# Patient Record
Sex: FEMALE | Race: WHITE | Employment: OTHER | URBAN - METROPOLITAN AREA
[De-identification: names, ages, dates, MRNs, and addresses within clinical notes are randomized per-mention and may not be internally consistent; named-entity substitution may affect disease eponyms.]

---

## 2017-01-16 ENCOUNTER — OFFICE VISIT (OUTPATIENT)
Dept: FAMILY MEDICINE CLINIC | Facility: CLINIC | Age: 37
End: 2017-01-16

## 2017-01-16 VITALS
TEMPERATURE: 99 F | BODY MASS INDEX: 41.89 KG/M2 | HEIGHT: 66 IN | SYSTOLIC BLOOD PRESSURE: 130 MMHG | RESPIRATION RATE: 14 BRPM | HEART RATE: 84 BPM | WEIGHT: 260.63 LBS | DIASTOLIC BLOOD PRESSURE: 88 MMHG

## 2017-01-16 DIAGNOSIS — R30.0 DYSURIA: Primary | ICD-10-CM

## 2017-01-16 LAB
MULTISTIX LOT#: ABNORMAL NUMERIC
NITRITE, URINE: POSITIVE
PH, URINE: 5 (ref 4.5–8)
SPECIFIC GRAVITY: 1 (ref 1–1.03)
URINE-COLOR: YELLOW
UROBILINOGEN,SEMI-QN: 0.2 MG/DL (ref 0–1.9)

## 2017-01-16 PROCEDURE — 87086 URINE CULTURE/COLONY COUNT: CPT | Performed by: FAMILY MEDICINE

## 2017-01-16 PROCEDURE — 87186 SC STD MICRODIL/AGAR DIL: CPT | Performed by: FAMILY MEDICINE

## 2017-01-16 PROCEDURE — 81003 URINALYSIS AUTO W/O SCOPE: CPT | Performed by: FAMILY MEDICINE

## 2017-01-16 PROCEDURE — 99213 OFFICE O/P EST LOW 20 MIN: CPT | Performed by: FAMILY MEDICINE

## 2017-01-16 PROCEDURE — 87088 URINE BACTERIA CULTURE: CPT | Performed by: FAMILY MEDICINE

## 2017-01-16 RX ORDER — CIPROFLOXACIN 500 MG/1
500 TABLET, FILM COATED ORAL 2 TIMES DAILY
Qty: 14 TABLET | Refills: 0 | Status: SHIPPED | OUTPATIENT
Start: 2017-01-16 | End: 2017-01-23

## 2017-01-16 NOTE — PROGRESS NOTES
Truong Mendenhall is a 43year old female. CC:  Patient presents with:  Kidney Problem      HPI:  The patient complains of uti.   Duration: 4 day(s)  Dysuria: yes  Frequency: no  Hesitancy: no  Fever: no  Back pain: yes, L flank area  Hematuria: no  Treatmen cyanosis or edema  RECTAL: not examined  GENITAL: not examined  LYMPH: no supraclavicular nodes  MUSCULOSKELETAL: normal ambulation  NEURO: not examined     ASSESSMENT AND PLAN    1. Dysuria  Take prescribed medications as directed.    Push fluids  - Urine

## 2018-01-18 ENCOUNTER — OFFICE VISIT (OUTPATIENT)
Dept: FAMILY MEDICINE CLINIC | Facility: CLINIC | Age: 38
End: 2018-01-18

## 2018-01-18 VITALS
DIASTOLIC BLOOD PRESSURE: 80 MMHG | WEIGHT: 260 LBS | BODY MASS INDEX: 41.78 KG/M2 | TEMPERATURE: 99 F | HEIGHT: 66 IN | HEART RATE: 96 BPM | SYSTOLIC BLOOD PRESSURE: 160 MMHG | RESPIRATION RATE: 16 BRPM

## 2018-01-18 DIAGNOSIS — I10 ESSENTIAL HYPERTENSION: ICD-10-CM

## 2018-01-18 DIAGNOSIS — B00.1 COLD SORE: Primary | ICD-10-CM

## 2018-01-18 DIAGNOSIS — R73.9 HYPERGLYCEMIA: ICD-10-CM

## 2018-01-18 PROCEDURE — 99213 OFFICE O/P EST LOW 20 MIN: CPT | Performed by: FAMILY MEDICINE

## 2018-01-18 RX ORDER — LISINOPRIL 10 MG/1
10 TABLET ORAL DAILY
Qty: 90 TABLET | Refills: 0 | Status: SHIPPED | OUTPATIENT
Start: 2018-01-18 | End: 2018-04-18

## 2018-01-18 RX ORDER — VALACYCLOVIR HYDROCHLORIDE 1 G/1
2 TABLET, FILM COATED ORAL EVERY 12 HOURS SCHEDULED
Qty: 12 TABLET | Refills: 0 | Status: SHIPPED | OUTPATIENT
Start: 2018-01-18 | End: 2018-01-25

## 2018-01-18 NOTE — PROGRESS NOTES
Gwen Martinez is a 42year old female. CC:  Patient presents with:  Mouth/Lip Problem: sores in mouth, nasal congestion per pt      HPI:  She has her yearly outbreak of cold sores and oral sroes. She is wanting screening for lipid and diabetes.  She h distress  EYE: B conjunctiva and lids normal  HENT: upper lip with cold sore, soft palate with ulcerative lesion, B pinnas, external auditory canals and tympanic membranes are normal.   NECK: No lymphadenopathy, thyromegaly or masses  CAR: S1, S2 normal, R

## 2018-02-17 ENCOUNTER — OFFICE VISIT (OUTPATIENT)
Dept: FAMILY MEDICINE CLINIC | Facility: CLINIC | Age: 38
End: 2018-02-17

## 2018-02-17 VITALS
TEMPERATURE: 98 F | RESPIRATION RATE: 14 BRPM | SYSTOLIC BLOOD PRESSURE: 140 MMHG | HEART RATE: 80 BPM | DIASTOLIC BLOOD PRESSURE: 72 MMHG | WEIGHT: 258 LBS | BODY MASS INDEX: 42 KG/M2

## 2018-02-17 DIAGNOSIS — L03.211 CELLULITIS OF FACE: Primary | ICD-10-CM

## 2018-02-17 PROCEDURE — 99213 OFFICE O/P EST LOW 20 MIN: CPT | Performed by: NURSE PRACTITIONER

## 2018-02-17 RX ORDER — CEPHALEXIN 500 MG/1
500 CAPSULE ORAL 3 TIMES DAILY
Qty: 30 CAPSULE | Refills: 0 | Status: SHIPPED | OUTPATIENT
Start: 2018-02-17 | End: 2018-02-27

## 2018-02-17 NOTE — PROGRESS NOTES
CHIEF COMPLAINT:   Patient presents with:  Head Injury: 4 days ago hit left side of head while fixing a truck. Pt states area now hurts and pain shoots down left ear. Pt states has a bump on left ear.        HPI:     Marianna Esquivel is a 42year old female who LYMPH: no enlargement of the lymph nodes. MUSC/SKEL: no joint swelling, no joint stiffness. NEURO: no abnormal sensation, no tingling of the skin or numbness.     EXAM:   /72   Pulse 80   Temp 98.1 °F (36.7 °C)   Resp 14   Wt 258 lb   LMP 02/09/2018 Cellulitis causes the affected skin to become red, swollen, warm, and sore. The reddened areas have a visible border. You may have a fever, chills, and pain. Cellulitis is treated with antibiotics taken for 7 to 10 days.  Symptoms should get better 1 to 2 The patient indicates understanding of these issues and agrees to the plan. The patient is asked to see PCP in 3 days if symptoms not improving or for worsening symptoms.

## 2018-02-17 NOTE — PATIENT INSTRUCTIONS
If getting worse to follow up in Immediate Care      Facial Cellulitis  Cellulitis is an infection of the deep layers of skin. A break in the skin, such as a cut or scratch, can let bacteria under the skin.  It may also occur from an infected oil gland (pim · An eyelid that swells shut or leaks fluid (pus)  · Headache or neck pain that gets worse  · Unusual drowsiness or confusion  · Convulsions (seizure)  · Change in eyesight     Date Last Reviewed: 9/1/2016  © 7970-9092 [employer] [address].  [address] [facility]

## 2018-03-07 ENCOUNTER — TELEPHONE (OUTPATIENT)
Dept: FAMILY MEDICINE CLINIC | Facility: CLINIC | Age: 38
End: 2018-03-07

## 2018-04-18 RX ORDER — LISINOPRIL 10 MG/1
TABLET ORAL
Qty: 90 TABLET | Refills: 0 | Status: SHIPPED | OUTPATIENT
Start: 2018-04-18 | End: 2018-07-26

## 2018-04-18 NOTE — TELEPHONE ENCOUNTER
Last refilled on 1/18/18 for # 90 with 0 refills    Last seen on 1/18/18, /72  Future Appointments  Date Time Provider Nirmal Levine   5/2/2018 7:15 PM Ryan Calderon MD [facility]        Thank you.

## 2018-05-05 ENCOUNTER — TELEPHONE (OUTPATIENT)
Dept: FAMILY MEDICINE CLINIC | Facility: CLINIC | Age: 38
End: 2018-05-05

## 2018-05-05 RX ORDER — LOSARTAN POTASSIUM 25 MG/1
25 TABLET ORAL DAILY
Qty: 30 TABLET | Refills: 1 | Status: SHIPPED | OUTPATIENT
Start: 2018-05-05 | End: 2018-05-19 | Stop reason: ALTCHOICE

## 2018-05-05 NOTE — TELEPHONE ENCOUNTER
Stop the Lisinopril  Start Losartan 25 mg, 1 qd, #30, 1 rf and see me back in about 4-6 weeks to ensure medication is working fine.   Thanks

## 2018-05-05 NOTE — TELEPHONE ENCOUNTER
LISINOPRIL 10 MG Oral Tab 90 tablet 0 4/18/2018     Sig: TAKE ONE TABLET BY MOUTH ONCE DAILY      Patient states that she has had a dry persistent cough since starting the medication. Believes it may be a side effect.  Should she stop taking this medication

## 2018-05-05 NOTE — TELEPHONE ENCOUNTER
Patient advised. Verbalized understanding.  Medication sent to [address] [facility] in [place] per patient request.

## 2018-05-09 ENCOUNTER — TELEPHONE (OUTPATIENT)
Dept: FAMILY MEDICINE CLINIC | Facility: CLINIC | Age: 38
End: 2018-05-09

## 2018-05-09 NOTE — TELEPHONE ENCOUNTER
Quit taking the meds on Sat, how long should side effects linger? Pt is still experiencing tightness in chest, cough and SOB.

## 2018-05-09 NOTE — TELEPHONE ENCOUNTER
Chest tightness and SOB are not typical Lisinopril side effects. A dry irritating cough is a typical side effect.  If she is not feeling well then I should see her in office this week, erick

## 2018-05-09 NOTE — TELEPHONE ENCOUNTER
Patient advised of the information per Dr. Cordelia Gusman. Appointment made for patient to see Dr. Cordelia Gusman.  Saturday May 19 2018[facility]

## 2018-05-12 ENCOUNTER — OFFICE VISIT (OUTPATIENT)
Dept: FAMILY MEDICINE CLINIC | Facility: CLINIC | Age: 38
End: 2018-05-12

## 2018-05-12 VITALS
DIASTOLIC BLOOD PRESSURE: 78 MMHG | OXYGEN SATURATION: 98 % | WEIGHT: 256 LBS | BODY MASS INDEX: 41 KG/M2 | SYSTOLIC BLOOD PRESSURE: 150 MMHG | HEART RATE: 80 BPM | RESPIRATION RATE: 16 BRPM | TEMPERATURE: 98 F

## 2018-05-12 DIAGNOSIS — J98.01 BRONCHOSPASM: Primary | ICD-10-CM

## 2018-05-12 DIAGNOSIS — J06.9 VIRAL URI: ICD-10-CM

## 2018-05-12 DIAGNOSIS — R05.9 COUGH: ICD-10-CM

## 2018-05-12 PROCEDURE — 99213 OFFICE O/P EST LOW 20 MIN: CPT | Performed by: FAMILY MEDICINE

## 2018-05-12 NOTE — PROGRESS NOTES
HPI:   Palmer Ayala is a 42year old female who presents for upper respiratory symptoms for  6  weeks. Patient reports congestion, clear to yellow colored nasal discharge, cough is keeping pt up at night, wheezing, has cough til she throws up,. She was instr lesions  EYES:PERRLA, EOMI, normal optic disk,conjunctiva are clear  HEENT: atraumatic, normocephalic,ears and throat are clear  NECK: supple,no adenopathy,no bruits  LUNGS: clear to auscultation  CARDIO: RRR without murmur  GI: good BS's,no masses, HSM or

## 2018-05-19 ENCOUNTER — OFFICE VISIT (OUTPATIENT)
Dept: FAMILY MEDICINE CLINIC | Facility: CLINIC | Age: 38
End: 2018-05-19

## 2018-05-19 VITALS
HEART RATE: 83 BPM | OXYGEN SATURATION: 98 % | WEIGHT: 251.81 LBS | TEMPERATURE: 99 F | BODY MASS INDEX: 41 KG/M2 | DIASTOLIC BLOOD PRESSURE: 86 MMHG | RESPIRATION RATE: 16 BRPM | SYSTOLIC BLOOD PRESSURE: 138 MMHG

## 2018-05-19 DIAGNOSIS — I10 ESSENTIAL HYPERTENSION: ICD-10-CM

## 2018-05-19 DIAGNOSIS — R05.9 COUGH: Primary | ICD-10-CM

## 2018-05-19 DIAGNOSIS — R09.81 NASAL CONGESTION: ICD-10-CM

## 2018-05-19 PROCEDURE — 99213 OFFICE O/P EST LOW 20 MIN: CPT | Performed by: FAMILY MEDICINE

## 2018-05-19 RX ORDER — BUDESONIDE AND FORMOTEROL FUMARATE DIHYDRATE 80; 4.5 UG/1; UG/1
2 AEROSOL RESPIRATORY (INHALATION) 2 TIMES DAILY
Qty: 2 INHALER | Refills: 0 | COMMUNITY
Start: 2018-05-19 | End: 2018-09-19 | Stop reason: ALTCHOICE

## 2018-05-19 RX ORDER — MONTELUKAST SODIUM 10 MG/1
10 TABLET ORAL NIGHTLY
Qty: 30 TABLET | Refills: 3 | Status: SHIPPED | OUTPATIENT
End: 2018-09-19 | Stop reason: ALTCHOICE

## 2018-05-19 RX ORDER — FLUTICASONE PROPIONATE 50 MCG
2 SPRAY, SUSPENSION (ML) NASAL DAILY
COMMUNITY
Start: 2018-05-19 | End: 2018-09-19 | Stop reason: ALTCHOICE

## 2018-05-19 NOTE — PROGRESS NOTES
Rocío Layton is a 42year old female. CC:  Patient presents with: Follow - Up: per pt, follow up on cough      HPI:  F/u cough and BP    Cough better with Breo. She has ongoing nasal congestion and does use Flonase daily. She is a former smoker.  She h normal  HENT: Nasal mucosa is blue, boggy with clear discharge.    NECK: No lymphadenopathy, thyromegaly or masses  CAR: S1, S2 normal, RRR; no S3, no S4; no click; murmur negative  PULM: clear to auscultation B, no accessory muscle use  GI: not examined  P

## 2018-07-26 RX ORDER — LISINOPRIL 10 MG/1
TABLET ORAL
Qty: 90 TABLET | Refills: 1 | Status: SHIPPED | OUTPATIENT
Start: 2018-07-26 | End: 2018-12-12 | Stop reason: SINTOL

## 2018-07-26 NOTE — TELEPHONE ENCOUNTER
Last refilled on 4/18/18 for # 90 with 0 refills  Last seen on 5/19/18, /86  No future appointments. Thank you.

## 2018-09-19 ENCOUNTER — OFFICE VISIT (OUTPATIENT)
Dept: FAMILY MEDICINE CLINIC | Facility: CLINIC | Age: 38
End: 2018-09-19

## 2018-09-19 DIAGNOSIS — J01.00 ACUTE MAXILLARY SINUSITIS, RECURRENCE NOT SPECIFIED: Primary | ICD-10-CM

## 2018-09-19 DIAGNOSIS — R05.3 CHRONIC COUGH: ICD-10-CM

## 2018-09-19 PROCEDURE — 99213 OFFICE O/P EST LOW 20 MIN: CPT | Performed by: NURSE PRACTITIONER

## 2018-09-19 RX ORDER — ALBUTEROL SULFATE 90 UG/1
2 AEROSOL, METERED RESPIRATORY (INHALATION) EVERY 4 HOURS PRN
Qty: 1 INHALER | Refills: 0 | Status: SHIPPED | OUTPATIENT
Start: 2018-09-19 | End: 2018-11-24

## 2018-09-19 RX ORDER — AMOXICILLIN AND CLAVULANATE POTASSIUM 875; 125 MG/1; MG/1
1 TABLET, FILM COATED ORAL 2 TIMES DAILY
Qty: 20 TABLET | Refills: 0 | Status: SHIPPED | OUTPATIENT
Start: 2018-09-19 | End: 2018-09-29

## 2018-09-19 RX ORDER — BENZONATATE 200 MG/1
200 CAPSULE ORAL 3 TIMES DAILY PRN
Qty: 15 CAPSULE | Refills: 0 | Status: SHIPPED | OUTPATIENT
Start: 2018-09-19 | End: 2018-11-24

## 2018-09-19 NOTE — PATIENT INSTRUCTIONS
Acute Sinusitis    Acute sinusitis is irritation and swelling of the sinuses. It is usually caused by a viral infection after a common cold. Your doctor can help you find relief. What is acute sinusitis?   Sinuses are air-filled spaces in the skull behin © 1078-7882 [employer] [address]. [address] [facility], [facility] [address]. All rights reserved. This information is not intended as a substitute for professional medical care. Always follow your healthcare professional's instructions.

## 2018-09-19 NOTE — PROGRESS NOTES
CHIEF COMPLAINT:   No chief complaint on file. HPI:   Pradeep Carey is a 34year old female who presents for sinus congestion, drainage, sore throat, ear pressure for  2  weeks.   Reports also having deep hacking cough, has had that for about 7 months, r • Temporomandibular joint disorders, unspecified 12      Past Surgical History:  No date:    History reviewed. No pertinent family history.    Social History    Tobacco Use      Smoking status: Former Smoker        Years: 7.00      Smokeless t PLAN:  Advised will need to follow up with PCP for chronic cough, likely needs chest XR at this point, since other treatments have not worked. Will given albuterol for \"coughing attacks\" at night. . Meds as below. Mucinex to help thin secretions.   Jose Car · Facial soreness pain  · Headache  · Fever  · Fluid draining in the back of the throat (postnasal drip)  · Congestion  · Drainage that is thick and colored, instead of clear  · Cough  Diagnosing acute sinusitis  Your doctor will ask about your symptoms an

## 2018-10-06 ENCOUNTER — TELEPHONE (OUTPATIENT)
Dept: FAMILY MEDICINE CLINIC | Facility: CLINIC | Age: 38
End: 2018-10-06

## 2018-10-06 DIAGNOSIS — R05.9 COUGH: Primary | ICD-10-CM

## 2018-10-06 NOTE — TELEPHONE ENCOUNTER
PT DOES NOT HAVE INSURANCE    PT WENT TO [address] ON 9/19 FOR SINUS INFECTION. SINUS INFECTION IS GONE, COUGH HAS NOT GONE AWAY. COUGH HAS BEEN AROUND SINCE MARCH. WAS ADV ON 9/19 TO GET CHEST X-RAY.  WOULD LIKE TO KNOW IF AN ORDER CAN BE PLACED    ADV

## 2018-10-10 ENCOUNTER — TELEPHONE (OUTPATIENT)
Dept: FAMILY MEDICINE CLINIC | Facility: CLINIC | Age: 38
End: 2018-10-10

## 2018-10-10 NOTE — TELEPHONE ENCOUNTER
Pt is going to do her Chest Xray at the [address] in [place].    She needs to have her order sent over  Fax [phone]

## 2018-10-11 ENCOUNTER — TELEPHONE (OUTPATIENT)
Dept: FAMILY MEDICINE CLINIC | Facility: CLINIC | Age: 38
End: 2018-10-11

## 2018-10-11 ENCOUNTER — MED REC SCAN ONLY (OUTPATIENT)
Dept: FAMILY MEDICINE CLINIC | Facility: CLINIC | Age: 38
End: 2018-10-11

## 2018-10-11 NOTE — TELEPHONE ENCOUNTER
Please let patient know or leave message that her CXR done at [facility] as normal.  If she is still coughing then the next kid is Consult Pulmonology   Jade Melendrez   P: [phone]   Thanks

## 2018-10-11 NOTE — TELEPHONE ENCOUNTER
Patient advised of the x-ray results per Dr. Perlita Gates.  Patient will call back the office if she wants the information for the pulmonologist.

## 2018-11-24 ENCOUNTER — NURSE ONLY (OUTPATIENT)
Dept: FAMILY MEDICINE CLINIC | Facility: CLINIC | Age: 38
End: 2018-11-24

## 2018-11-24 VITALS
SYSTOLIC BLOOD PRESSURE: 120 MMHG | HEIGHT: 67 IN | WEIGHT: 240 LBS | OXYGEN SATURATION: 99 % | BODY MASS INDEX: 37.67 KG/M2 | TEMPERATURE: 98 F | HEART RATE: 90 BPM | RESPIRATION RATE: 16 BRPM | DIASTOLIC BLOOD PRESSURE: 82 MMHG

## 2018-11-24 DIAGNOSIS — R35.0 URINARY FREQUENCY: ICD-10-CM

## 2018-11-24 DIAGNOSIS — R39.9 UTI SYMPTOMS: Primary | ICD-10-CM

## 2018-11-24 PROCEDURE — 99213 OFFICE O/P EST LOW 20 MIN: CPT | Performed by: NURSE PRACTITIONER

## 2018-11-24 PROCEDURE — 81003 URINALYSIS AUTO W/O SCOPE: CPT | Performed by: NURSE PRACTITIONER

## 2018-11-24 RX ORDER — NITROFURANTOIN 25; 75 MG/1; MG/1
100 CAPSULE ORAL 2 TIMES DAILY
Qty: 14 CAPSULE | Refills: 0 | Status: SHIPPED | OUTPATIENT
Start: 2018-11-24 | End: 2018-12-01

## 2018-11-24 RX ORDER — PHENAZOPYRIDINE HYDROCHLORIDE 200 MG/1
200 TABLET, FILM COATED ORAL 3 TIMES DAILY PRN
Qty: 10 TABLET | Refills: 0 | Status: SHIPPED | OUTPATIENT
Start: 2018-11-24 | End: 2020-09-15

## 2018-11-24 NOTE — PROGRESS NOTES
CHIEF COMPLAINT:   Patient presents with:  Urinary Frequency: with urgency, NO dysuria - x5 days      HPI:   Rupert Perez is a 34year old female who presents with symptoms of UTI. Complaining of urinary frequency, urgency, dysuria for 5 days.   Symptoms h Breastfeeding? No   BMI 37.59 kg/m²   GENERAL: well developed, well nourished,in no apparent distress  CARDIO: RRR, no murmurs  LUNGS: clear to ausculation bilaterally, no wheezing or rhonchi  GI: BS present x 4. No hepatosplenomegaly.   : slight suprapu 3 days if not better. Seek care immediately for new onset of fever, vomiting, worsening symptoms.

## 2019-02-21 ENCOUNTER — OFFICE VISIT (OUTPATIENT)
Dept: FAMILY MEDICINE CLINIC | Facility: CLINIC | Age: 39
End: 2019-02-21

## 2019-02-21 VITALS
TEMPERATURE: 98 F | DIASTOLIC BLOOD PRESSURE: 86 MMHG | RESPIRATION RATE: 18 BRPM | SYSTOLIC BLOOD PRESSURE: 128 MMHG | BODY MASS INDEX: 40.02 KG/M2 | WEIGHT: 255 LBS | OXYGEN SATURATION: 100 % | HEIGHT: 67 IN | HEART RATE: 96 BPM

## 2019-02-21 DIAGNOSIS — R39.9 UTI SYMPTOMS: ICD-10-CM

## 2019-02-21 DIAGNOSIS — N30.01 ACUTE CYSTITIS WITH HEMATURIA: Primary | ICD-10-CM

## 2019-02-21 LAB
BILIRUBIN: NEGATIVE
GLUCOSE (URINE DIPSTICK): NEGATIVE MG/DL
KETONES (URINE DIPSTICK): NEGATIVE MG/DL
MULTISTIX LOT#: ABNORMAL NUMERIC
NITRITE, URINE: NEGATIVE
PH, URINE: 6 (ref 4.5–8)
PROTEIN (URINE DIPSTICK): 100 MG/DL
SPECIFIC GRAVITY: 1.02 (ref 1–1.03)
URINE-COLOR: YELLOW
UROBILINOGEN,SEMI-QN: 0.2 MG/DL (ref 0–1.9)

## 2019-02-21 PROCEDURE — 99213 OFFICE O/P EST LOW 20 MIN: CPT | Performed by: PHYSICIAN ASSISTANT

## 2019-02-21 PROCEDURE — 81003 URINALYSIS AUTO W/O SCOPE: CPT | Performed by: PHYSICIAN ASSISTANT

## 2019-02-21 RX ORDER — CIPROFLOXACIN 500 MG/1
500 TABLET, FILM COATED ORAL 2 TIMES DAILY
Qty: 10 TABLET | Refills: 0 | Status: SHIPPED | OUTPATIENT
Start: 2019-02-21 | End: 2019-02-26

## 2019-02-21 NOTE — PROGRESS NOTES
CHIEF COMPLAINT:   Patient presents with:  Urinary Symptoms (urologic): increase in frequency/voiding urgency x 2 weeks. no pain while urinating/fever      HPI:   Dorothy Ramirez is a 34year old female who presents with symptoms of UTI.  Complaining of urina night    Drug use: No        REVIEW OF SYSTEMS:   GENERAL: See above  SKIN: no rashes, no skin wounds or ulcers. GI: See HPI. : See HPI. NEURO: no headaches.     EXAM:   /86 (BP Location: Left arm, Patient Position: Sitting, Cuff Size: large) tablet (500 mg total) by mouth 2 (two) times daily for 5 days. Risk and benefits of medication discussed. Stressed importance of completing full course of antibiotic unless told otherwise.      The patient indicates understanding of these issues and

## 2019-02-27 ENCOUNTER — OFFICE VISIT (OUTPATIENT)
Dept: FAMILY MEDICINE CLINIC | Facility: CLINIC | Age: 39
End: 2019-02-27

## 2019-02-27 ENCOUNTER — TELEPHONE (OUTPATIENT)
Dept: FAMILY MEDICINE CLINIC | Facility: CLINIC | Age: 39
End: 2019-02-27

## 2019-02-27 VITALS
SYSTOLIC BLOOD PRESSURE: 134 MMHG | TEMPERATURE: 98 F | BODY MASS INDEX: 41 KG/M2 | DIASTOLIC BLOOD PRESSURE: 84 MMHG | OXYGEN SATURATION: 98 % | WEIGHT: 262 LBS | HEART RATE: 82 BPM

## 2019-02-27 DIAGNOSIS — R35.0 URINARY FREQUENCY: Primary | ICD-10-CM

## 2019-02-27 DIAGNOSIS — R39.9 UTI SYMPTOMS: ICD-10-CM

## 2019-02-27 PROCEDURE — 99212 OFFICE O/P EST SF 10 MIN: CPT | Performed by: FAMILY MEDICINE

## 2019-02-27 RX ORDER — SULFAMETHOXAZOLE AND TRIMETHOPRIM 800; 160 MG/1; MG/1
TABLET ORAL
Qty: 14 TABLET | Refills: 0 | Status: SHIPPED | OUTPATIENT
Start: 2019-02-27 | End: 2019-03-06

## 2019-02-27 NOTE — TELEPHONE ENCOUNTER
Pt was seen at [facility] for a UTI about a week ago and is still having symptoms. Please call back.

## 2019-02-27 NOTE — TELEPHONE ENCOUNTER
Patient was seen at [facility] on 2 21 2019 and treated for a UTI - Patient declined a urine culture due to cost. She was started on Cipro   Patient was advised to follow up with PCP in 3 days if no better.    Patient states that she finished the antibiotic - but

## 2019-02-27 NOTE — PROGRESS NOTES
George Rsoales is a 34year old female. CC:  Patient presents with:  UTI: per pt- frequency, no burning      HPI:  F/u UTI. Seen at [facility] on 2/21/19. Diagnosed with UTI based on symptoms and U/A.  She is still having UTI symptoms of frequency despite being NECK: No lymphadenopathy, thyromegaly or masses  CAR: S1, S2 normal, RRR; no S3, no S4; no click; murmur negative  PULM: clear to auscultation B, no accessory muscle use  GI: not examined  PSYCH: alert and oriented x 3; affect appropriate  SKIN: not exam

## 2019-10-15 ENCOUNTER — OFFICE VISIT (OUTPATIENT)
Dept: FAMILY MEDICINE CLINIC | Facility: CLINIC | Age: 39
End: 2019-10-15

## 2019-10-15 VITALS
BODY MASS INDEX: 41 KG/M2 | OXYGEN SATURATION: 100 % | DIASTOLIC BLOOD PRESSURE: 64 MMHG | HEART RATE: 88 BPM | SYSTOLIC BLOOD PRESSURE: 162 MMHG | TEMPERATURE: 98 F | RESPIRATION RATE: 16 BRPM | WEIGHT: 262 LBS

## 2019-10-15 DIAGNOSIS — R39.9 UTI SYMPTOMS: Primary | ICD-10-CM

## 2019-10-15 DIAGNOSIS — N30.01 ACUTE CYSTITIS WITH HEMATURIA: ICD-10-CM

## 2019-10-15 PROCEDURE — 99213 OFFICE O/P EST LOW 20 MIN: CPT | Performed by: PHYSICIAN ASSISTANT

## 2019-10-15 PROCEDURE — 81003 URINALYSIS AUTO W/O SCOPE: CPT | Performed by: PHYSICIAN ASSISTANT

## 2019-10-15 RX ORDER — NITROFURANTOIN 25; 75 MG/1; MG/1
100 CAPSULE ORAL 2 TIMES DAILY
Qty: 14 CAPSULE | Refills: 0 | Status: SHIPPED | OUTPATIENT
Start: 2019-10-15 | End: 2019-10-22

## 2019-10-15 NOTE — PROGRESS NOTES
CHIEF COMPLAINT:   Patient presents with:  Urinary Symptoms (urologic): increase in frequency/pain while urinating x last night. no fever      HPI:   Coit Mount Wolf is a 37year old female who presents with symptoms of UTI.  Complaining of urinary frequency, chills, or body aches  SKIN: no rashes, no skin wounds or ulcers. CARDIOVASCULAR: denies chest pain or palpitations  LUNGS: denies shortness of breath, cough, or wheezing  GI: See HPI. No N/V/C/D. : See HPI.   Musculo: No back pain     EXAM:   BP (!) 1 discussed. Stressed importance of completing full course of antibiotic unless told otherwise. The patient indicates understanding of these issues and agrees to the plan. The patient is asked to see PCP in 3 days if not better.  Seek care immediately

## 2019-10-15 NOTE — PATIENT INSTRUCTIONS
Push fluids   Please follow up with PCP if no improvement or if symptoms worsen   Blood pressure elevated in office. Monitor at home.  Follow up with PCP

## 2020-06-15 ENCOUNTER — OFFICE VISIT (OUTPATIENT)
Dept: FAMILY MEDICINE CLINIC | Facility: CLINIC | Age: 40
End: 2020-06-15

## 2020-06-15 VITALS
TEMPERATURE: 99 F | HEART RATE: 106 BPM | BODY MASS INDEX: 42.02 KG/M2 | SYSTOLIC BLOOD PRESSURE: 140 MMHG | HEIGHT: 64.5 IN | RESPIRATION RATE: 14 BRPM | OXYGEN SATURATION: 99 % | WEIGHT: 249.19 LBS | DIASTOLIC BLOOD PRESSURE: 76 MMHG

## 2020-06-15 DIAGNOSIS — Z30.9 ENCOUNTER FOR CONTRACEPTIVE MANAGEMENT, UNSPECIFIED TYPE: Primary | ICD-10-CM

## 2020-06-15 DIAGNOSIS — M77.11 LATERAL EPICONDYLITIS OF RIGHT ELBOW: ICD-10-CM

## 2020-06-15 PROCEDURE — 99213 OFFICE O/P EST LOW 20 MIN: CPT | Performed by: FAMILY MEDICINE

## 2020-06-15 RX ORDER — MEDROXYPROGESTERONE ACETATE 150 MG/ML
150 INJECTION, SUSPENSION INTRAMUSCULAR
Qty: 1 ML | Refills: 3 | Status: SHIPPED | OUTPATIENT
Start: 2020-06-15 | End: 2020-06-16

## 2020-06-15 NOTE — PROGRESS NOTES
Rupert Perez is a 37year old female. CC:  Patient presents with:  Menstrual Problem: per pt- wanting to control period  Elbow Pain: right elbow, hard to extend      HPI:  She would like to reduce frequency of menses. She is done having children.  Her h BMI 42.11 kg/m²    Reviewed by Nelson Ross M.D.     Physical Exam:  GEN: well developed, well nourished, in no apparent distress  EYE: B conjunctiva and lids normal  HENT: B pinnas, external auditory canals and tympanic membranes are normal.   NECK: No lymp

## 2020-06-16 ENCOUNTER — NURSE ONLY (OUTPATIENT)
Dept: FAMILY MEDICINE CLINIC | Facility: CLINIC | Age: 40
End: 2020-06-16

## 2020-06-16 DIAGNOSIS — Z30.9 ENCOUNTER FOR CONTRACEPTIVE MANAGEMENT, UNSPECIFIED TYPE: Primary | ICD-10-CM

## 2020-06-16 PROCEDURE — 81025 URINE PREGNANCY TEST: CPT | Performed by: FAMILY MEDICINE

## 2020-06-16 PROCEDURE — 96372 THER/PROPH/DIAG INJ SC/IM: CPT | Performed by: FAMILY MEDICINE

## 2020-06-16 RX ORDER — MEDROXYPROGESTERONE ACETATE 150 MG/ML
150 INJECTION, SUSPENSION INTRAMUSCULAR ONCE
Status: COMPLETED | OUTPATIENT
Start: 2020-06-16 | End: 2020-06-16

## 2020-06-16 RX ADMIN — MEDROXYPROGESTERONE ACETATE 150 MG: 150 INJECTION, SUSPENSION INTRAMUSCULAR at 15:08:00

## 2020-06-16 NOTE — PROGRESS NOTES
Patient here for 1st depo. Patient afebrile  Urine preg complete-negative  Patient supplied medication  Administered to left deltoid  Tolerated well  Advised to return in 12-13 weeks 9/8-9/15/2020  Left office in stable condition.    Recall placed

## 2020-07-06 ENCOUNTER — TELEPHONE (OUTPATIENT)
Dept: FAMILY MEDICINE CLINIC | Facility: CLINIC | Age: 40
End: 2020-07-06

## 2020-07-06 NOTE — TELEPHONE ENCOUNTER
Patient advised it is not uncommon for spotting to occur after first depo. This is her body adjusting. Denies any heavy bleeding, abdominal pain or cramping. Routing to Dr. Naomi Greenberg for any further information. No need to call if nothing more to add.

## 2020-07-06 NOTE — TELEPHONE ENCOUNTER
Pt had the Depo done on the 16th. She started spotting since the 25th, its not time for her cycle. She wants to know if this normal. And for how long.    Please return call to [phone]

## 2020-08-04 ENCOUNTER — TELEPHONE (OUTPATIENT)
Dept: FAMILY MEDICINE CLINIC | Facility: CLINIC | Age: 40
End: 2020-08-04

## 2020-08-04 NOTE — TELEPHONE ENCOUNTER
Not much we can do for the spotting that happens initially when starting the Depo Provera shot. It typically slows down and stops at the 6-9 month zeeshan of starting the shot.    Thanks

## 2020-08-04 NOTE — TELEPHONE ENCOUNTER
Pt called she spoke with  about her concerns about the depo shot, she wanted to know if there way anything she can do or take to stop the bleeding?

## 2020-09-15 ENCOUNTER — OFFICE VISIT (OUTPATIENT)
Dept: FAMILY MEDICINE CLINIC | Facility: CLINIC | Age: 40
End: 2020-09-15

## 2020-09-15 VITALS
BODY MASS INDEX: 35.31 KG/M2 | HEART RATE: 91 BPM | HEIGHT: 67 IN | TEMPERATURE: 99 F | WEIGHT: 225 LBS | DIASTOLIC BLOOD PRESSURE: 79 MMHG | OXYGEN SATURATION: 98 % | SYSTOLIC BLOOD PRESSURE: 133 MMHG | RESPIRATION RATE: 18 BRPM

## 2020-09-15 DIAGNOSIS — R39.9 UTI SYMPTOMS: Primary | ICD-10-CM

## 2020-09-15 LAB
BILIRUBIN: NEGATIVE
GLUCOSE (URINE DIPSTICK): NEGATIVE MG/DL
KETONES (URINE DIPSTICK): NEGATIVE MG/DL
MULTISTIX LOT#: 1044 NUMERIC
NITRITE, URINE: NEGATIVE
PH, URINE: 6 (ref 4.5–8)
PROTEIN (URINE DIPSTICK): NEGATIVE MG/DL
SPECIFIC GRAVITY: 1.02 (ref 1–1.03)
URINE-COLOR: YELLOW
UROBILINOGEN,SEMI-QN: 0.2 MG/DL (ref 0–1.9)

## 2020-09-15 PROCEDURE — 87088 URINE BACTERIA CULTURE: CPT | Performed by: NURSE PRACTITIONER

## 2020-09-15 PROCEDURE — 87186 SC STD MICRODIL/AGAR DIL: CPT | Performed by: NURSE PRACTITIONER

## 2020-09-15 PROCEDURE — 3008F BODY MASS INDEX DOCD: CPT | Performed by: NURSE PRACTITIONER

## 2020-09-15 PROCEDURE — 99213 OFFICE O/P EST LOW 20 MIN: CPT | Performed by: NURSE PRACTITIONER

## 2020-09-15 PROCEDURE — 81003 URINALYSIS AUTO W/O SCOPE: CPT | Performed by: NURSE PRACTITIONER

## 2020-09-15 PROCEDURE — 3078F DIAST BP <80 MM HG: CPT | Performed by: NURSE PRACTITIONER

## 2020-09-15 PROCEDURE — 87086 URINE CULTURE/COLONY COUNT: CPT | Performed by: NURSE PRACTITIONER

## 2020-09-15 PROCEDURE — 3075F SYST BP GE 130 - 139MM HG: CPT | Performed by: NURSE PRACTITIONER

## 2020-09-15 RX ORDER — NITROFURANTOIN 25; 75 MG/1; MG/1
100 CAPSULE ORAL 2 TIMES DAILY
Qty: 10 CAPSULE | Refills: 0 | Status: SHIPPED | OUTPATIENT
Start: 2020-09-15 | End: 2020-09-20

## 2020-09-15 NOTE — PATIENT INSTRUCTIONS
1. Rest. Drink plenty of fluids. 2. Nitrofurantoin (Macrobid) as prescribed. 3. We will send urine culture to lab and call you with results in 3-4 days.  At that time we will discuss continuing, stopping, or changing the antibiotic based on the urine cult infections are not contagious. You can't get one from someone else, from a toilet seat, or from sharing a bath. The most common cause of bladder infections is bacteria from the bowels. The bacteria get onto the skin around the opening of the urethra.  From provider told you not to. · Clean yourself correctly after going to the bathroom. Wipe from front to back after using the toilet. This helps prevent the spread of bacteria. · Urinate more often. Don't try to hold urine in for a long time.   · Wear loose-f for professional medical care. Always follow your healthcare professional's instructions.

## 2020-09-15 NOTE — PROGRESS NOTES
CHIEF COMPLAINT:   Patient presents with:  UTI      HPI:   Verna Lemus is a 41year old female who presents with symptoms of UTI. Complaining of urinary frequency, urgency, dysuria for last 2 days.   Denies flank pain, fever, hematuria, nausea, or vomitin LUNGS: clear to ausculation bilaterally, no wheezing or rhonchi  GI: BS present x 4. No hepatosplenomegaly. : No suprapubic tenderness.  No bladder distention, or CVAT     Recent Results (from the past 24 hour(s))   URINALYSIS, AUTO, W/O SCOPE    Collec 1. Rest. Drink plenty of fluids. 2. Nitrofurantoin (Macrobid) as prescribed. 3. We will send urine culture to lab and call you with results in 3-4 days.  At that time we will discuss continuing, stopping, or changing the antibiotic based on the urine cult Bladder infections are not contagious. You can't get one from someone else, from a toilet seat, or from sharing a bath. The most common cause of bladder infections is bacteria from the bowels.  The bacteria get onto the skin around the opening of the ureth · Drink plenty of fluids. This helps to prevent dehydration and flush out your bladder. Do this unless you must restrict fluids for other health reasons, or your healthcare provider told you not to. · Clean yourself correctly after going to the bathroom. Cesar last reviewed this educational content on 11/1/2019  © 0235-7840 [employer] [address]. [address] [facility] [place], [facility] [place]. All rights reserved. This information is not intended as a substitute for professional medical care.  Always foll

## 2020-12-22 ENCOUNTER — OFFICE VISIT (OUTPATIENT)
Dept: FAMILY MEDICINE CLINIC | Facility: CLINIC | Age: 40
End: 2020-12-22

## 2020-12-22 VITALS
WEIGHT: 225 LBS | RESPIRATION RATE: 18 BRPM | OXYGEN SATURATION: 100 % | HEIGHT: 67 IN | BODY MASS INDEX: 35.31 KG/M2 | SYSTOLIC BLOOD PRESSURE: 144 MMHG | DIASTOLIC BLOOD PRESSURE: 60 MMHG | TEMPERATURE: 98 F | HEART RATE: 93 BPM

## 2020-12-22 DIAGNOSIS — K12.0 APHTHOUS STOMATITIS: Primary | ICD-10-CM

## 2020-12-22 DIAGNOSIS — R01.1 CARDIAC MURMUR: ICD-10-CM

## 2020-12-22 PROCEDURE — 3078F DIAST BP <80 MM HG: CPT | Performed by: PHYSICIAN ASSISTANT

## 2020-12-22 PROCEDURE — 3008F BODY MASS INDEX DOCD: CPT | Performed by: PHYSICIAN ASSISTANT

## 2020-12-22 PROCEDURE — 3077F SYST BP >= 140 MM HG: CPT | Performed by: PHYSICIAN ASSISTANT

## 2020-12-22 PROCEDURE — 99213 OFFICE O/P EST LOW 20 MIN: CPT | Performed by: PHYSICIAN ASSISTANT

## 2020-12-22 NOTE — PATIENT INSTRUCTIONS
Understanding Canker Sores  Canker sores (aphthous ulcers) are small, painful sores in the mouth. They occur most often on the tongue, gums, or insides of the cheeks. What causes a canker sore? The exact cause of canker sores is not known.  But they are Mouth sores that seem to be canker sores can be signs of a more serious illness. If you have other signs of illness along with mouth sores, talk with a healthcare provider.  Canker sores can be so painful that they cause problems with talking, eating, or dr

## 2020-12-22 NOTE — PROGRESS NOTES
CHIEF COMPLAINT:   Patient presents with:  Sinus Problem: X 2 days sores on roof of mouth and tongue. fatigue, sinus headache,         HPI:   Jerome Fernando is 41year old female presents to clinic with complaint of  sore throat.   Symptoms began with tender noted in the the HPI.       EXAM:   /60   Pulse 93   Temp 98.3 °F (36.8 °C) (Temporal)   Resp 18   Ht 5' 7\" (1.702 m)   Wt 225 lb (102.1 kg)   LMP 11/25/2020   SpO2 100%   BMI 35.24 kg/m²   GENERAL: well developed, well nourished,in no apparent distr are steadily improving over the next few days to a week.

## 2021-02-11 ENCOUNTER — OFFICE VISIT (OUTPATIENT)
Dept: FAMILY MEDICINE CLINIC | Facility: CLINIC | Age: 41
End: 2021-02-11

## 2021-02-11 VITALS
WEIGHT: 250 LBS | HEIGHT: 67 IN | HEART RATE: 87 BPM | DIASTOLIC BLOOD PRESSURE: 80 MMHG | TEMPERATURE: 98 F | OXYGEN SATURATION: 100 % | BODY MASS INDEX: 39.24 KG/M2 | SYSTOLIC BLOOD PRESSURE: 138 MMHG | RESPIRATION RATE: 16 BRPM

## 2021-02-11 DIAGNOSIS — N30.01 ACUTE CYSTITIS WITH HEMATURIA: Primary | ICD-10-CM

## 2021-02-11 LAB
BILIRUBIN: NEGATIVE
GLUCOSE (URINE DIPSTICK): NEGATIVE MG/DL
KETONES (URINE DIPSTICK): NEGATIVE MG/DL
MULTISTIX LOT#: 5077 NUMERIC
NITRITE, URINE: NEGATIVE
PH, URINE: 6.5 (ref 4.5–8)
PROTEIN (URINE DIPSTICK): 100 MG/DL
SPECIFIC GRAVITY: 1.02 (ref 1–1.03)
URINE-COLOR: YELLOW
UROBILINOGEN,SEMI-QN: 0.2 MG/DL (ref 0–1.9)

## 2021-02-11 PROCEDURE — 87088 URINE BACTERIA CULTURE: CPT | Performed by: NURSE PRACTITIONER

## 2021-02-11 PROCEDURE — 87086 URINE CULTURE/COLONY COUNT: CPT | Performed by: NURSE PRACTITIONER

## 2021-02-11 PROCEDURE — 99213 OFFICE O/P EST LOW 20 MIN: CPT | Performed by: NURSE PRACTITIONER

## 2021-02-11 PROCEDURE — 3075F SYST BP GE 130 - 139MM HG: CPT | Performed by: NURSE PRACTITIONER

## 2021-02-11 PROCEDURE — 3008F BODY MASS INDEX DOCD: CPT | Performed by: NURSE PRACTITIONER

## 2021-02-11 PROCEDURE — 87186 SC STD MICRODIL/AGAR DIL: CPT | Performed by: NURSE PRACTITIONER

## 2021-02-11 PROCEDURE — 81003 URINALYSIS AUTO W/O SCOPE: CPT | Performed by: NURSE PRACTITIONER

## 2021-02-11 PROCEDURE — 3079F DIAST BP 80-89 MM HG: CPT | Performed by: NURSE PRACTITIONER

## 2021-02-11 RX ORDER — NITROFURANTOIN 25; 75 MG/1; MG/1
100 CAPSULE ORAL 2 TIMES DAILY
Qty: 10 CAPSULE | Refills: 0 | Status: SHIPPED | OUTPATIENT
Start: 2021-02-11 | End: 2021-02-16

## 2021-02-11 NOTE — PATIENT INSTRUCTIONS
1. Rest. Drink plenty of fluids. 2. Macrobid as prescribed. 3. We will send urine culture to lab and call you with results in 3-4 days. At that time we will discuss continuing, stopping, or changing the antibiotic based on the urine culture results.   4. may need a low-dose antibiotic for several months. Take antibiotics exactly as directed. Don’t stop taking them until all of the medicine is gone. If you stop taking the antibiotic too soon, the infection may not go away.  You may also develop a resistance

## 2021-02-11 NOTE — PROGRESS NOTES
CHIEF COMPLAINT:   Patient presents with:  UTI: uti sx x 2 days      HPI:   Camelia Howe is a 41year old female who presents with symptoms of UTI. Complaining of urinary frequency, urgency, dysuria for last 2 days.   Denies flank pain, fever, hematuria, n GENERAL: well developed, well nourished,in no apparent distress  CARDIO: RRR, no murmurs  LUNGS: clear to ausculation bilaterally, no wheezing or rhonchi  GI: No tenderness or guarding with palpation. No hepatosplenomegaly. : No suprapubic tenderness.  No Discussed HPI and physical exam with pt. Pt has a reassuring physical exam consistent with a lower uti. Treatment options discussed with patient and explained in detail. Will start Macrobid pending urine culture results.  The risks, benefits and potential s · Cystitis. A bladder infection (cystitis) is the most common UTI in women. You may have urgent or frequent need to pee. You may also have pain, burning when you pee, and bloody urine. · Urethritis.  This is an inflamed urethra, which is the tube that tineo · Use condoms during sex. These help prevent UTIs caused by sexually transmitted bacteria. Also don't use spermicides during sex. These can increase the risk for UTIs. Choose other forms of birth control instead.  For women who tend to get UTIs after sex, a

## 2021-02-25 ENCOUNTER — TELEMEDICINE (OUTPATIENT)
Dept: FAMILY MEDICINE CLINIC | Facility: CLINIC | Age: 41
End: 2021-02-25

## 2021-02-25 ENCOUNTER — TELEPHONE (OUTPATIENT)
Dept: FAMILY MEDICINE CLINIC | Facility: CLINIC | Age: 41
End: 2021-02-25

## 2021-02-25 DIAGNOSIS — N39.0 RECURRENT UTI: Primary | ICD-10-CM

## 2021-02-25 PROCEDURE — 99213 OFFICE O/P EST LOW 20 MIN: CPT | Performed by: FAMILY MEDICINE

## 2021-02-25 RX ORDER — SULFAMETHOXAZOLE AND TRIMETHOPRIM 800; 160 MG/1; MG/1
TABLET ORAL
Qty: 30 TABLET | Refills: 0 | Status: SHIPPED | OUTPATIENT
Start: 2021-02-25 | End: 2021-07-13

## 2021-02-25 NOTE — TELEPHONE ENCOUNTER
Wanted to know about getting a scanning prescription (antibiotic) for recurring UTI's.   Please call pt at [phone]

## 2021-02-25 NOTE — TELEPHONE ENCOUNTER
Patient advised of the information per . Appointment made. Pansy Boas verbally consents to a Virtual/Telephone Check-In service on 2 25 2021.   Patient understands and accepts financial responsibility for any deductible, co-insurance and/or co-p

## 2021-02-25 NOTE — TELEPHONE ENCOUNTER
Patient states that she gets frequent UTI's. She goes to the walk in clinic frequently.    Patient states that the walk in clinic recommended she see if she can get an a prescription for an antibiotic that she can take after intercourse to see if that will

## 2021-02-25 NOTE — PROGRESS NOTES
My Chart/ Video/Telephone Visit Check-In Due to [address] verbally consents a video Check-In service on 02/25/21.   Patient understands and accepts financial responsibility for any deductible, co-insurance and/or co-pays associated wit with me  Psych: Alert and oriented x 3, speech was not pressured  Resp: No respiratory distress noted when conversing with me, able to speak in full sentences. ASSESSMENT AND PLAN    1.  Recurrent UTI  We did discuss her seeing Uro-Gynecology to kristie acr[facility]

## 2021-07-13 RX ORDER — SULFAMETHOXAZOLE AND TRIMETHOPRIM 800; 160 MG/1; MG/1
TABLET ORAL
Qty: 30 TABLET | Refills: 0 | Status: SHIPPED | OUTPATIENT
Start: 2021-07-13

## 2022-01-04 ENCOUNTER — TELEPHONE (OUTPATIENT)
Dept: FAMILY MEDICINE CLINIC | Facility: CLINIC | Age: 42
End: 2022-01-04

## 2022-01-04 NOTE — TELEPHONE ENCOUNTER
Natasha Natasha tested positive for COVID. Symptom onset was 12/29 and tested positive yesterday 1/3/22. She states that Demetria became symptomatic on 1/1/22 and Harika Schaefer became symptomatic 1/3/22 but did not test since they have her same symptoms.     The patient

## 2022-01-04 NOTE — TELEPHONE ENCOUNTER
Rest as much as needed, push clear liquids and use Tylenol and/or Motrin for aches or fevers. Quarantine for 5 days from the onset of symptoms. If asymptomatic then the quarantine starts from the date the test was performed.  The quarantine can end after th

## 2022-05-24 ENCOUNTER — TELEMEDICINE (OUTPATIENT)
Dept: FAMILY MEDICINE CLINIC | Facility: CLINIC | Age: 42
End: 2022-05-24

## 2022-05-24 DIAGNOSIS — R49.0 HOARSENESS OF VOICE: Primary | ICD-10-CM

## 2022-05-24 PROCEDURE — 99213 OFFICE O/P EST LOW 20 MIN: CPT | Performed by: FAMILY MEDICINE

## 2022-12-01 ENCOUNTER — OFFICE VISIT (OUTPATIENT)
Dept: FAMILY MEDICINE CLINIC | Facility: CLINIC | Age: 42
End: 2022-12-01

## 2022-12-01 VITALS
BODY MASS INDEX: 39 KG/M2 | RESPIRATION RATE: 18 BRPM | SYSTOLIC BLOOD PRESSURE: 158 MMHG | DIASTOLIC BLOOD PRESSURE: 72 MMHG | HEART RATE: 94 BPM | TEMPERATURE: 99 F | OXYGEN SATURATION: 100 % | WEIGHT: 250 LBS

## 2022-12-01 DIAGNOSIS — R39.9 UTI SYMPTOMS: Primary | ICD-10-CM

## 2022-12-01 LAB
BILIRUBIN: NEGATIVE
GLUCOSE (URINE DIPSTICK): NEGATIVE MG/DL
KETONES (URINE DIPSTICK): NEGATIVE MG/DL
MULTISTIX LOT#: ABNORMAL NUMERIC
NITRITE, URINE: NEGATIVE
PH, URINE: 6.5 (ref 4.5–8)
PROTEIN (URINE DIPSTICK): 100 MG/DL
SPECIFIC GRAVITY: 1.01 (ref 1–1.03)
URINE-COLOR: YELLOW
UROBILINOGEN,SEMI-QN: 0.2 MG/DL (ref 0–1.9)

## 2022-12-01 PROCEDURE — 87086 URINE CULTURE/COLONY COUNT: CPT | Performed by: NURSE PRACTITIONER

## 2022-12-01 PROCEDURE — 87088 URINE BACTERIA CULTURE: CPT | Performed by: NURSE PRACTITIONER

## 2022-12-01 PROCEDURE — 87186 SC STD MICRODIL/AGAR DIL: CPT | Performed by: NURSE PRACTITIONER

## 2022-12-01 RX ORDER — NITROFURANTOIN 25; 75 MG/1; MG/1
100 CAPSULE ORAL 2 TIMES DAILY
Qty: 14 CAPSULE | Refills: 0 | Status: SHIPPED | OUTPATIENT
Start: 2022-12-01 | End: 2022-12-08

## 2022-12-01 NOTE — PATIENT INSTRUCTIONS
1. Rest. Drink plenty of fluids. 2. Macrobid as prescribed. 3. We will send urine culture to lab and call you with results in 3-4 days. At that time we will discuss continuing, stopping, or changing the antibiotic based on the urine culture results. 4. Follow up with PMD in 4-5 days for reeval. Follow up sooner or go to the emergency department immediately if symptoms worsen, change, or if you have any concerns.

## 2023-01-23 NOTE — TELEPHONE ENCOUNTER
Last OV 5/24/22 telemed, 6- office  Last lab [facility] 2013  Not filled previously by [facility]    See patient original message.  Dr Tadeo Cartwright refused script, patient would like to know if [facility] will fill losartan

## 2023-01-23 NOTE — TELEPHONE ENCOUNTER
PT CALLED AND ADV NEEDS REFILL OF MEDS FROM DR Noé Mclean    PT ADV THAT DR Noé Mclean HAS DENIED REFILL. WONDERING IF DR CHAUHAN WOULD FILL?     losartan 100 MG Oral Tab     ERICO TANIA WATTS    ** PT AWARE  OUT OF OFFICE TILL TOMORROW **    THANK YOU

## 2023-01-24 RX ORDER — LOSARTAN POTASSIUM 100 MG/1
100 TABLET ORAL DAILY
Qty: 90 TABLET | Refills: 0 | Status: SHIPPED | OUTPATIENT
Start: 2023-01-24

## 2023-01-24 NOTE — TELEPHONE ENCOUNTER
Please let patient or caregiver know or leave message that:   It's been refilled for 3 months. I will need to see her in office for a recheck of her BP before I can refill again. Her last BP in the [facility] in 12/2022 was high. We need to see if that is true or a result of the illness she was being seen for.   Thank

## 2023-01-24 NOTE — TELEPHONE ENCOUNTER
Pt notified she needs to be seen for further refills but is unable to schedule at this time. She will call back to schedule appt at a later time.

## 2023-04-20 ENCOUNTER — OFFICE VISIT (OUTPATIENT)
Dept: FAMILY MEDICINE CLINIC | Facility: CLINIC | Age: 43
End: 2023-04-20

## 2023-04-20 VITALS
TEMPERATURE: 98 F | BODY MASS INDEX: 39 KG/M2 | DIASTOLIC BLOOD PRESSURE: 75 MMHG | OXYGEN SATURATION: 99 % | SYSTOLIC BLOOD PRESSURE: 130 MMHG | HEART RATE: 94 BPM | WEIGHT: 247 LBS | RESPIRATION RATE: 16 BRPM

## 2023-04-20 DIAGNOSIS — I10 HYPERTENSION, UNSPECIFIED TYPE: Primary | ICD-10-CM

## 2023-04-20 DIAGNOSIS — H00.012 HORDEOLUM EXTERNUM OF RIGHT LOWER EYELID: ICD-10-CM

## 2023-04-20 DIAGNOSIS — F41.9 ANXIETY: ICD-10-CM

## 2023-04-20 PROCEDURE — 99214 OFFICE O/P EST MOD 30 MIN: CPT | Performed by: FAMILY MEDICINE

## 2023-04-20 RX ORDER — CITALOPRAM 20 MG/1
10 TABLET ORAL DAILY
COMMUNITY
Start: 2023-04-20

## 2023-04-20 RX ORDER — LOSARTAN POTASSIUM 100 MG/1
100 TABLET ORAL DAILY
Qty: 90 TABLET | Refills: 3 | Status: SHIPPED | OUTPATIENT
Start: 2023-04-20

## 2023-04-20 RX ORDER — ALPRAZOLAM 0.5 MG/1
0.5 TABLET ORAL
Qty: 30 TABLET | Refills: 0 | Status: SHIPPED | OUTPATIENT
Start: 2023-04-20

## 2023-04-24 ENCOUNTER — TELEPHONE (OUTPATIENT)
Dept: FAMILY MEDICINE CLINIC | Facility: CLINIC | Age: 43
End: 2023-04-24

## 2023-04-24 NOTE — TELEPHONE ENCOUNTER
Pharmacy called stating gentamicin 0.3 % Ophthalmic Ointment is not available for order/ to be filled. Pharmacy asking if PCP would like to prescribe a substitution?     LOV: 4/20/23

## 2023-04-25 RX ORDER — ERYTHROMYCIN 5 MG/G
OINTMENT OPHTHALMIC
Qty: 3.5 G | Refills: 0 | Status: SHIPPED | OUTPATIENT
Start: 2023-04-25 | End: 2023-05-02

## 2023-11-10 ENCOUNTER — TELEPHONE (OUTPATIENT)
Dept: FAMILY MEDICINE CLINIC | Facility: CLINIC | Age: 43
End: 2023-11-10

## 2023-11-10 ENCOUNTER — OFFICE VISIT (OUTPATIENT)
Dept: FAMILY MEDICINE CLINIC | Facility: CLINIC | Age: 43
End: 2023-11-10

## 2023-11-10 VITALS
RESPIRATION RATE: 18 BRPM | HEART RATE: 79 BPM | WEIGHT: 247 LBS | BODY MASS INDEX: 39 KG/M2 | DIASTOLIC BLOOD PRESSURE: 80 MMHG | TEMPERATURE: 98 F | OXYGEN SATURATION: 98 % | SYSTOLIC BLOOD PRESSURE: 148 MMHG

## 2023-11-10 DIAGNOSIS — L03.012 PARONYCHIA OF LEFT INDEX FINGER: Primary | ICD-10-CM

## 2023-11-10 PROCEDURE — 99213 OFFICE O/P EST LOW 20 MIN: CPT | Performed by: PHYSICIAN ASSISTANT

## 2023-11-10 RX ORDER — CEPHALEXIN 500 MG/1
500 CAPSULE ORAL 3 TIMES DAILY
Qty: 21 CAPSULE | Refills: 0 | Status: SHIPPED | OUTPATIENT
Start: 2023-11-10 | End: 2023-11-17

## 2023-11-10 NOTE — TELEPHONE ENCOUNTER
Patient's name and  verified    Patient stated few weeks ago her Index finger on left side was red and inflamed. Patient thought it was infected and soaked the finger in peroxide. The finger got better. Now it has come back on the same finger just the other side of the nail. Any suggestions?    Please Advise

## 2023-11-10 NOTE — TELEPHONE ENCOUNTER
Patient has an infected finger nail    She thought it was clearing up but now it has gone to the other side as well    She has been doing peroxide soaks    Any other suggestions?     Please adv  Thank you

## 2023-11-10 NOTE — TELEPHONE ENCOUNTER
Sounds like it needs to be seen. I have no appts today  If NP has an appt then let's do that. Otherwise should be seen at [address] [facility] [address] [place]

## 2023-11-30 ENCOUNTER — OFFICE VISIT (OUTPATIENT)
Dept: FAMILY MEDICINE CLINIC | Facility: CLINIC | Age: 43
End: 2023-11-30

## 2023-11-30 ENCOUNTER — TELEPHONE (OUTPATIENT)
Dept: FAMILY MEDICINE CLINIC | Facility: CLINIC | Age: 43
End: 2023-11-30

## 2023-11-30 VITALS
DIASTOLIC BLOOD PRESSURE: 72 MMHG | BODY MASS INDEX: 39 KG/M2 | OXYGEN SATURATION: 99 % | TEMPERATURE: 98 F | SYSTOLIC BLOOD PRESSURE: 138 MMHG | WEIGHT: 250.19 LBS | HEART RATE: 89 BPM | RESPIRATION RATE: 18 BRPM

## 2023-11-30 DIAGNOSIS — L03.012 CELLULITIS OF FINGER OF LEFT HAND: Primary | ICD-10-CM

## 2023-11-30 PROCEDURE — 99213 OFFICE O/P EST LOW 20 MIN: CPT | Performed by: FAMILY MEDICINE

## 2023-11-30 NOTE — TELEPHONE ENCOUNTER
Pt would like an appt - states she has infected fingers  Started in index and traveled to middle finger left hand. Both fingers tips are swollen. States getting better but still a lot of redness and can see infected. [facility] gave prescription but doesn't think strong enough and would like an appt to see Dr. Mariah Lin. Please advise. Thank you!

## 2023-11-30 NOTE — TELEPHONE ENCOUNTER
Patient's name and  verified   Future Appointments   Date Time Provider Nirmal Levine   2023 12:20 PM Dev Verdin MD [facility]       Patient notified and verbalized an understanding

## 2024-01-02 ENCOUNTER — PATIENT MESSAGE (OUTPATIENT)
Dept: FAMILY MEDICINE CLINIC | Facility: CLINIC | Age: 44
End: 2024-01-02

## 2024-01-02 NOTE — TELEPHONE ENCOUNTER
From: Lori Dey  To: Steve Caballero  Sent: 1/2/2024 2:23 PM CST  Subject: Fingernail infection     Hello I just wanted to tell  That my index fingernail still has infection and wanted to know what he wants me to do   Other nails are healthy   Thank you

## 2024-01-03 RX ORDER — AMOXICILLIN AND CLAVULANATE POTASSIUM 500; 125 MG/1; MG/1
TABLET, FILM COATED ORAL
Qty: 20 TABLET | Refills: 0 | Status: SHIPPED | OUTPATIENT
Start: 2024-01-03 | End: 2024-01-13

## 2024-04-07 RX ORDER — LOSARTAN POTASSIUM 100 MG/1
100 TABLET ORAL DAILY
Qty: 90 TABLET | Refills: 1 | Status: SHIPPED | OUTPATIENT
Start: 2024-04-07

## 2024-04-23 RX ORDER — CITALOPRAM 20 MG/1
10 TABLET ORAL DAILY
Qty: 90 TABLET | Refills: 0 | Status: SHIPPED | OUTPATIENT
Start: 2024-04-23 | End: 2024-04-24

## 2024-04-24 RX ORDER — CITALOPRAM 20 MG/1
10 TABLET ORAL DAILY
Qty: 90 TABLET | Refills: 0 | Status: SHIPPED | OUTPATIENT
Start: 2024-04-24

## 2024-04-24 NOTE — TELEPHONE ENCOUNTER
Received paperwork that the orders for citalopram 20 mg need clarification.. how many day patient is on 0.5 tablet?     Old script  Take 0.5 tablets (10 mg total) by mouth daily. TAKE 1 TABLET BY MOUTH EVERY NIGHT. Dispense: 90 tablet, Refills: 0 ordered     Need new script with correction

## 2024-04-24 NOTE — TELEPHONE ENCOUNTER
I'd like for her to get 6 months worth of medication. Thus the way the rx has been generated. Can the pharmacy not do this? I do not believe she has insurance so there should be not limitations with that.  Thanks

## 2024-04-25 ENCOUNTER — OFFICE VISIT (OUTPATIENT)
Dept: FAMILY MEDICINE CLINIC | Facility: CLINIC | Age: 44
End: 2024-04-25

## 2024-04-25 VITALS
DIASTOLIC BLOOD PRESSURE: 80 MMHG | OXYGEN SATURATION: 99 % | HEART RATE: 94 BPM | SYSTOLIC BLOOD PRESSURE: 138 MMHG | RESPIRATION RATE: 16 BRPM | WEIGHT: 252 LBS | BODY MASS INDEX: 39 KG/M2 | TEMPERATURE: 98 F

## 2024-04-25 DIAGNOSIS — J98.01 BRONCHOSPASM: Primary | ICD-10-CM

## 2024-04-25 PROCEDURE — 99213 OFFICE O/P EST LOW 20 MIN: CPT | Performed by: FAMILY MEDICINE

## 2024-04-25 RX ORDER — ALBUTEROL SULFATE 90 UG/1
2 AEROSOL, METERED RESPIRATORY (INHALATION) 4 TIMES DAILY
Qty: 1 EACH | Refills: 0 | Status: SHIPPED | OUTPATIENT
Start: 2024-04-25

## 2024-04-25 RX ORDER — PREDNISONE 20 MG/1
TABLET ORAL
Qty: 18 TABLET | Refills: 0 | Status: SHIPPED | OUTPATIENT
Start: 2024-04-25

## 2024-04-25 NOTE — PROGRESS NOTES
Lori Dey is a 43 year old female.    CC:    Chief Complaint   Patient presents with    Cough       HPI:  Cough and wheezy for a few months. Some SOB. NO fevers. She was a social smoker up to 4 weeks ago. OTC motrin and antihistamine not helping. Review of charting shows similar episodes in the past.   Allergies:  Allergies   Allergen Reactions    Medrol [Methylprednisolone]       Current Meds:  Current Outpatient Medications   Medication Sig Dispense Refill    citalopram 20 MG Oral Tab Take 0.5 tablets (10 mg total) by mouth daily. 90 tablet 0    losartan 100 MG Oral Tab Take 1 tablet (100 mg total) by mouth daily. 90 tablet 1    ALPRAZolam (XANAX) 0.5 MG Oral Tab Take 1 tablet (0.5 mg total) by mouth daily as needed for Anxiety. 30 tablet 0        History:  Past Medical History:    Anxiety    Depression    Dysfunction of eustachian tube    Esophageal reflux    Essential hypertension, benign    Family history of diabetes mellitus    Temporomandibular joint disorders, unspecified      Past Surgical History:   Procedure Laterality Date            No family history on file.   No family status information on file.      Social History     Socioeconomic History    Marital status:     Number of children: 1   Occupational History    Occupation: homemaker   Tobacco Use    Smoking status: Former     Types: Cigarettes    Smokeless tobacco: Never    Tobacco comments:     exposure to passive smoke   Substance and Sexual Activity    Alcohol use: Yes     Alcohol/week: 0.0 standard drinks of alcohol     Comment: 1drink per night    Drug use: No        ROS:  General: energy level stable  GI: Denies heartburn or reflux    Vitals: /80 (BP Location: Right arm, Patient Position: Sitting, Cuff Size: adult)   Pulse 94   Temp 97.8 °F (36.6 °C)   Resp 16   Wt 252 lb (114.3 kg)   SpO2 99%   BMI 39.47 kg/m²    Reviewed by BAM Caballero M.D.    Physical Exam:  GEN: well developed, well nourished, in no  apparent distress  EYE: B conjunctiva and lids normal  HENT: B nares with clear dc, Post nasal drip and cobblestoning present on the posterior pharynx, B pinnas, external auditory canals and tympanic membranes are normal.   NECK: No lymphadenopathy, thyromegaly or masses  CAR: S1, S2 normal, RRR; no S3, no S4; no click; murmur negative  PULM: clear to auscultation B, no accessory muscle use  GI: not examined  PSYCH: alert and oriented x 3; affect appropriate  SKIN: not examined  EXTREMITIES: No clubbing, cyanosis or edema  GENITAL: not examined  LYMPH: no supraclavicular nodes  NEURO: Awake and alert. Normal speech and articulation. No facial droop or asymmetry. Moving all extremities equally.    ASSESSMENT AND PLAN    1. Bronchospasm  Take prescribed medications as directed.   F/u in 1 week      No follow-ups on file.    Orders for this visit:    No orders of the defined types were placed in this encounter.      None    Meds & Refills for this Visit:  Requested Prescriptions     Signed Prescriptions Disp Refills    predniSONE 20 MG Oral Tab 18 tablet 0     Sig: 3 qd x 3 days, then 2 qd x 3 days, then 1 qd x 3 days with food. Has taken this medication in the past without issue.    albuterol 108 (90 Base) MCG/ACT Inhalation Aero Soln 1 each 0     Sig: Inhale 2 puffs into the lungs 4 (four) times daily. May substitute with brand name equivalent if less expensive.             Authorized by Steve Caballero M.D.

## 2024-05-02 ENCOUNTER — OFFICE VISIT (OUTPATIENT)
Dept: FAMILY MEDICINE CLINIC | Facility: CLINIC | Age: 44
End: 2024-05-02

## 2024-05-02 VITALS
DIASTOLIC BLOOD PRESSURE: 70 MMHG | SYSTOLIC BLOOD PRESSURE: 138 MMHG | OXYGEN SATURATION: 100 % | HEART RATE: 86 BPM | BODY MASS INDEX: 39 KG/M2 | TEMPERATURE: 98 F | WEIGHT: 251 LBS | RESPIRATION RATE: 16 BRPM

## 2024-05-02 DIAGNOSIS — J98.01 BRONCHOSPASM: Primary | ICD-10-CM

## 2024-05-02 PROCEDURE — 99213 OFFICE O/P EST LOW 20 MIN: CPT | Performed by: FAMILY MEDICINE

## 2024-05-02 NOTE — PROGRESS NOTES
Lori Dey is a 43 year old female.    CC:    Chief Complaint   Patient presents with    Follow - Up       HPI:  F/u bronchospasm. Taking Albuterol and prednisone as directed. No issues with meds. Her breathing has nicely improved. No SOB.  Allergies:  Allergies   Allergen Reactions    Medrol [Methylprednisolone]       Current Meds:  Current Outpatient Medications   Medication Sig Dispense Refill    citalopram 20 MG Oral Tab Take 0.5 tablets (10 mg total) by mouth daily. 90 tablet 0    predniSONE 20 MG Oral Tab 3 qd x 3 days, then 2 qd x 3 days, then 1 qd x 3 days with food. Has taken this medication in the past without issue. 18 tablet 0    albuterol 108 (90 Base) MCG/ACT Inhalation Aero Soln Inhale 2 puffs into the lungs 4 (four) times daily. May substitute with brand name equivalent if less expensive. 1 each 0    losartan 100 MG Oral Tab Take 1 tablet (100 mg total) by mouth daily. 90 tablet 1    ALPRAZolam (XANAX) 0.5 MG Oral Tab Take 1 tablet (0.5 mg total) by mouth daily as needed for Anxiety. 30 tablet 0        History:  Past Medical History:    Anxiety    Depression    Dysfunction of eustachian tube    Esophageal reflux    Essential hypertension, benign    Family history of diabetes mellitus    Temporomandibular joint disorders, unspecified      Past Surgical History:   Procedure Laterality Date            No family history on file.   No family status information on file.      Social History     Socioeconomic History    Marital status:     Number of children: 1   Occupational History    Occupation: homemaker   Tobacco Use    Smoking status: Former     Types: Cigarettes    Smokeless tobacco: Never    Tobacco comments:     exposure to passive smoke   Substance and Sexual Activity    Alcohol use: Yes     Alcohol/week: 0.0 standard drinks of alcohol     Comment: 1drink per night    Drug use: No        ROS:  General: energy level stable      Vitals: /70   Pulse 86   Temp 98.3 °F  (36.8 °C)   Resp 16   Wt 251 lb (113.9 kg)   SpO2 100%   BMI 39.31 kg/m²    Reviewed by BAM Caballero M.D.    Physical Exam:  GEN: well developed, well nourished, in no apparent distress  EYE: B conjunctiva and lids normal  HENT: ---  NECK: No lymphadenopathy, thyromegaly or masses  CAR: S1, S2 normal, RRR; no S3, no S4; no click; murmur negative  PULM: clear to auscultation B, no accessory muscle use  GI: not examined  PSYCH: alert and oriented x 3; affect appropriate  SKIN: not examined  EXTREMITIES: No clubbing, cyanosis or edema  GENITAL: not examined  LYMPH: no supraclavicular nodes  NEURO: Awake and alert. Normal speech and articulation. No facial droop or asymmetry. Moving all extremities equally.    ASSESSMENT AND PLAN    1. Bronchospasm  Resolved  Finish Prednisone  Reduce Albuterol to BID dosing x 1 week and then stop  F/u if symptoms regress      No follow-ups on file.    Orders for this visit:    No orders of the defined types were placed in this encounter.      None    Meds & Refills for this Visit:  Requested Prescriptions      No prescriptions requested or ordered in this encounter             Authorized by Steve Caballero M.D.

## 2024-05-31 ENCOUNTER — OFFICE VISIT (OUTPATIENT)
Dept: FAMILY MEDICINE CLINIC | Facility: CLINIC | Age: 44
End: 2024-05-31

## 2024-05-31 VITALS
SYSTOLIC BLOOD PRESSURE: 140 MMHG | WEIGHT: 240 LBS | OXYGEN SATURATION: 97 % | DIASTOLIC BLOOD PRESSURE: 70 MMHG | HEIGHT: 67 IN | BODY MASS INDEX: 37.67 KG/M2 | TEMPERATURE: 98 F | RESPIRATION RATE: 18 BRPM | HEART RATE: 91 BPM

## 2024-05-31 DIAGNOSIS — R01.1 CARDIAC MURMUR: ICD-10-CM

## 2024-05-31 DIAGNOSIS — R05.3 CHRONIC COUGH: ICD-10-CM

## 2024-05-31 DIAGNOSIS — J06.9 UPPER RESPIRATORY TRACT INFECTION, UNSPECIFIED TYPE: Primary | ICD-10-CM

## 2024-05-31 PROCEDURE — 99213 OFFICE O/P EST LOW 20 MIN: CPT | Performed by: PHYSICIAN ASSISTANT

## 2024-05-31 RX ORDER — BENZONATATE 200 MG/1
200 CAPSULE ORAL 3 TIMES DAILY PRN
Qty: 30 CAPSULE | Refills: 0 | Status: SHIPPED | OUTPATIENT
Start: 2024-05-31

## 2024-05-31 NOTE — PROGRESS NOTES
CHIEF COMPLAINT:     Chief Complaint   Patient presents with    Sinus Problem     Cough for couple months   Started wed, sinus pressure headache   No fevers        HPI:   Lori Dey is a 43 year old female who presents with concerns for having a sinus infection.   She has been having on going cough for 2 months or more and following with her PCP.  She completed prednisone about 3 1/2 weeks ago and notes improvement in cough but not resolution.  In the past 2 days she has developed some sinus pressure and fullness particularly on the left.  She has some discomfort in her teeth on the left and some ear fullness.  She denies fever, sob, cp.  No history of chronic sinusitis.  She quit tobacco use a few months ago.            Current Outpatient Medications   Medication Sig Dispense Refill    benzonatate 200 MG Oral Cap Take 1 capsule (200 mg total) by mouth 3 (three) times daily as needed for cough. 30 capsule 0    albuterol 108 (90 Base) MCG/ACT Inhalation Aero Soln Inhale 2 puffs into the lungs 4 (four) times daily. May substitute with brand name equivalent if less expensive. 1 each 0    citalopram 20 MG Oral Tab Take 0.5 tablets (10 mg total) by mouth daily. 90 tablet 0    losartan 100 MG Oral Tab Take 1 tablet (100 mg total) by mouth daily. 90 tablet 1    ALPRAZolam (XANAX) 0.5 MG Oral Tab Take 1 tablet (0.5 mg total) by mouth daily as needed for Anxiety. 30 tablet 0      Past Medical History:    Anxiety    Depression    Dysfunction of eustachian tube    Esophageal reflux    Essential hypertension, benign    Family history of diabetes mellitus    Temporomandibular joint disorders, unspecified      Past Surgical History:   Procedure Laterality Date            No family history on file.   Social History     Socioeconomic History    Marital status:     Number of children: 1   Occupational History    Occupation: homemaker   Tobacco Use    Smoking status: Former     Types: Cigarettes    Smokeless  tobacco: Never    Tobacco comments:     exposure to passive smoke   Substance and Sexual Activity    Alcohol use: Yes     Alcohol/week: 0.0 standard drinks of alcohol     Comment: 1drink per night    Drug use: No         REVIEW OF SYSTEMS:   GENERAL:  intact appetite  SKIN: no rashes or abnormal skin lesions  HEENT: See HPI.    LUNGS: denies shortness of breath or wheezing, See HPI  CARDIOVASCULAR: denies chest pain or palpitations   GI: denies N/V/C or abdominal pain  NEURO: + sinus headaches.  No numbness or tingling in face.    EXAM:   /70   Pulse 91   Temp 97.8 °F (36.6 °C)   Resp 18   Ht 5' 7\" (1.702 m)   Wt 240 lb (108.9 kg)   LMP 05/17/2024   SpO2 97%   BMI 37.59 kg/m²   GENERAL: well developed, well nourished,in no apparent distress  SKIN: no rashes,no suspicious lesions  HEAD: atraumatic, normocephalic, no facial swelling or erythema, no tenderness on palpation of maxillary sinuses  EYES: conjunctiva clear, EOM intact  EARS: TM's non erythematous   NOSE: nasal mucosa reddened and inflammed  THROAT: Posterior pharynx is non erythematous, minimal PND,  no exudates.  NECK: supple, non-tender  LUNGS: clear to auscultation bilaterally, no wheezes or rhonchi with quiet respiraton but on forced respiraton a wheeze can be provoked.  Breathing is non labored.  CARDIO: S1S2 RRR .  Grade 2 systolic murmur head best at base.  EXTREMITIES: no cyanosis, clubbing or edema  LYMPH:  no gross lymphadenopathy.      No results found for this or any previous visit (from the past 24 hour(s)).    ASSESSMENT AND PLAN:   Lori Dey is a 43 year old female who presents with Sinus Problem (Cough for couple months /Started wed, sinus pressure headache /No fevers ). Symptoms are consistent with:      ASSESSMENT:  Encounter Diagnoses   Name Primary?    Upper respiratory tract infection, unspecified type Yes    Chronic cough     Cardiac murmur        PLAN: discussed her sinus symptoms are likely viral or allergy  related and antibiotic for sinus symptoms have no role at this time.  Suggest tylenol sinus and monitor BP for elevation, flonase, benzonatate and albuterol for cough.  Advise follow up with PCP for persistent cough symptoms given her smoking history.   She reports she has a remote history of cardiac murmur related to mitral valve but has not had any follow up on it.    Meds as below.  Comfort care instructions as listed in Patient Instructions    Meds & Refills for this Visit:  Requested Prescriptions     Signed Prescriptions Disp Refills    benzonatate 200 MG Oral Cap 30 capsule 0     Sig: Take 1 capsule (200 mg total) by mouth 3 (three) times daily as needed for cough.       Risks, benefits, side effects of medication addressed and explained.    There are no Patient Instructions on file for this visit.    The patient indicates understanding of these issues and agrees to the plan.  The patient is asked to follow up with PCP if sx's persist or worsen.

## 2024-09-23 ENCOUNTER — OFFICE VISIT (OUTPATIENT)
Dept: FAMILY MEDICINE CLINIC | Facility: CLINIC | Age: 44
End: 2024-09-23

## 2024-09-23 VITALS
TEMPERATURE: 97 F | OXYGEN SATURATION: 98 % | DIASTOLIC BLOOD PRESSURE: 76 MMHG | WEIGHT: 240 LBS | RESPIRATION RATE: 18 BRPM | SYSTOLIC BLOOD PRESSURE: 138 MMHG | HEART RATE: 94 BPM | BODY MASS INDEX: 38 KG/M2

## 2024-09-23 DIAGNOSIS — J01.90 ACUTE BACTERIAL RHINOSINUSITIS: ICD-10-CM

## 2024-09-23 DIAGNOSIS — H66.001 RIGHT ACUTE SUPPURATIVE OTITIS MEDIA: Primary | ICD-10-CM

## 2024-09-23 DIAGNOSIS — B96.89 ACUTE BACTERIAL RHINOSINUSITIS: ICD-10-CM

## 2024-09-23 PROCEDURE — 99213 OFFICE O/P EST LOW 20 MIN: CPT | Performed by: FAMILY MEDICINE

## 2024-09-23 RX ORDER — ALBUTEROL SULFATE 90 UG/1
INHALANT RESPIRATORY (INHALATION)
Qty: 1 EACH | Refills: 0 | Status: SHIPPED | OUTPATIENT
Start: 2024-09-23

## 2024-09-23 NOTE — PROGRESS NOTES
Lori Dey is a 44 year old female.    S:  Patient presents today with the following concerns:  Chief Complaint   Patient presents with    Cough     Started last wed    Nasal congestion, post nasal drainage, wheezing.  Ears are plugged.  No fevers, N/V/D.  Symptoms worsening.   Headaches.    Current Outpatient Medications   Medication Sig Dispense Refill    amoxicillin clavulanate 875-125 MG Oral Tab Take 1 tablet by mouth 2 (two) times daily for 7 days. 14 tablet 0    albuterol (PROAIR HFA) 108 (90 Base) MCG/ACT Inhalation Aero Soln 1-2 puffs every 4-6 hours for 2-3 days then as needed. 1 each 0    benzonatate 200 MG Oral Cap Take 1 capsule (200 mg total) by mouth 3 (three) times daily as needed for cough. 30 capsule 0    albuterol 108 (90 Base) MCG/ACT Inhalation Aero Soln Inhale 2 puffs into the lungs 4 (four) times daily. May substitute with brand name equivalent if less expensive. 1 each 0    citalopram 20 MG Oral Tab Take 0.5 tablets (10 mg total) by mouth daily. 90 tablet 0    losartan 100 MG Oral Tab Take 1 tablet (100 mg total) by mouth daily. 90 tablet 1    ALPRAZolam (XANAX) 0.5 MG Oral Tab Take 1 tablet (0.5 mg total) by mouth daily as needed for Anxiety. 30 tablet 0     Patient Active Problem List   Diagnosis    Hypertension    Depression     No family history on file.    REVIEW OF SYSTEMS:  GENERAL: feels fatigued  SKIN: denies any unusual skin lesions  EYES:denies vision change  LUNGS: see above  CARDIOVASCULAR: denies chest pain on exertion  GI: denies abdominal pain.  No N/V/D/C  : denies dysuria  MUSCULOSKELETAL: denies back pain  NEURO: headaches    EXAM:  /76   Pulse 94   Temp 97 °F (36.1 °C)   Resp 18   Wt 240 lb (108.9 kg)   LMP 05/17/2024   SpO2 98%   BMI 37.59 kg/m²   Physical Exam  Constitutional:       General: She is not in acute distress.     Appearance: Normal appearance. She is not ill-appearing, toxic-appearing or diaphoretic.   HENT:      Head: Normocephalic  and atraumatic.      Right Ear: Ear canal normal.      Left Ear: Tympanic membrane and ear canal normal.      Ears:      Comments: Right TM bulging and mild erythema.      Mouth/Throat:      Mouth: Mucous membranes are moist.      Pharynx: Oropharynx is clear.   Eyes:      Extraocular Movements: Extraocular movements intact.      Conjunctiva/sclera: Conjunctivae normal.      Pupils: Pupils are equal, round, and reactive to light.   Cardiovascular:      Rate and Rhythm: Normal rate and regular rhythm.      Heart sounds: Murmur heard.      Comments: Grade 2 systolic murmur  Pulmonary:      Effort: Pulmonary effort is normal.      Breath sounds: Rhonchi present. No wheezing or rales.   Musculoskeletal:      Cervical back: No rigidity or tenderness.   Lymphadenopathy:      Cervical: No cervical adenopathy.   Skin:     General: Skin is warm and dry.   Neurological:      General: No focal deficit present.      Mental Status: She is alert and oriented to person, place, and time.   Psychiatric:         Mood and Affect: Mood normal.         Behavior: Behavior normal.        ASSESSMENT AND PLAN:  Lori Dey is a 44 year old female.  Encounter Diagnoses   Name Primary?    Right acute suppurative otitis media Yes    Acute bacterial rhinosinusitis        No results found.     No orders of the defined types were placed in this encounter.    Meds & Refills for this Visit:  Requested Prescriptions     Signed Prescriptions Disp Refills    amoxicillin clavulanate 875-125 MG Oral Tab 14 tablet 0     Sig: Take 1 tablet by mouth 2 (two) times daily for 7 days.    albuterol (PROAIR HFA) 108 (90 Base) MCG/ACT Inhalation Aero Soln 1 each 0     Si-2 puffs every 4-6 hours for 2-3 days then as needed.     Imaging & Consults:  None  Augmentin as above.  Albuterol.  Increase fluids, steam, rest.  Should have follow up with PCP regarding murmur.  Go to ED with dyspnea, chest pain, worsening symptoms.  Follow up if symptoms change,  worsen, do not improve.    Patient verbalizes understanding of plan.  No follow-ups on file.

## 2024-10-03 ENCOUNTER — TELEPHONE (OUTPATIENT)
Dept: FAMILY MEDICINE CLINIC | Facility: CLINIC | Age: 44
End: 2024-10-03

## 2024-10-03 NOTE — TELEPHONE ENCOUNTER
Difficult to say what she needs without seeing her and examining her.  Please schedule her a visit tomorrow. Noon is fine if no appts are available.  Thanks

## 2024-10-03 NOTE — TELEPHONE ENCOUNTER
Patient's name and  verified     Future Appointments   Date Time Provider Department Center   10/4/2024 12:00 PM Steve Caballero MD [facility]         Patient notified and verbalized an understanding

## 2024-10-03 NOTE — TELEPHONE ENCOUNTER
Pt went to [facility] last week and was diagnosed with sinus infection and bronchitis.  Pt states she still has a rumbly cough.  Do you think she should try a different antibiotic?  Finished all of 7 day antibiotic course given at [facility].  Symptoms remaining: Shortness of breath (mild) and cough.  Please advise.  Thank you!        [id number] - [place], [place] - [address] [facility] [place] D[place] [phone], [phone]   [address] [facility] [place] [facility] [address]   Phone: [phone] Fax: [phone]

## 2024-10-03 NOTE — TELEPHONE ENCOUNTER
Patient's name and  verified     Patient went to [facility] on 2024 according to patient diagnosis sinus infection and bronchitis. Patient has finished her Augmentin.     Patient is still experiencing wheezing, shortness of breath, cough(wet), bringing clear phlegm, sometimes dizzy, tired but no fever.     Patient is wondering if she should get another antibiotic.     Verified pharmacy: [id number] - [place], [place]     Please Advise

## 2024-10-04 ENCOUNTER — OFFICE VISIT (OUTPATIENT)
Dept: FAMILY MEDICINE CLINIC | Facility: CLINIC | Age: 44
End: 2024-10-04

## 2024-10-04 VITALS
HEIGHT: 67 IN | BODY MASS INDEX: 38.45 KG/M2 | DIASTOLIC BLOOD PRESSURE: 78 MMHG | RESPIRATION RATE: 16 BRPM | OXYGEN SATURATION: 98 % | WEIGHT: 245 LBS | SYSTOLIC BLOOD PRESSURE: 150 MMHG | TEMPERATURE: 98 F | HEART RATE: 85 BPM

## 2024-10-04 DIAGNOSIS — R05.8 PRODUCTIVE COUGH: Primary | ICD-10-CM

## 2024-10-04 DIAGNOSIS — J98.01 BRONCHOSPASM: ICD-10-CM

## 2024-10-04 DIAGNOSIS — R01.1 CARDIAC MURMUR: ICD-10-CM

## 2024-10-04 PROCEDURE — 99213 OFFICE O/P EST LOW 20 MIN: CPT | Performed by: FAMILY MEDICINE

## 2024-10-04 RX ORDER — PREDNISONE 20 MG/1
TABLET ORAL
Qty: 18 TABLET | Refills: 0 | Status: SHIPPED | OUTPATIENT
Start: 2024-10-04 | End: 2024-10-13

## 2024-10-04 RX ORDER — AZITHROMYCIN 250 MG/1
TABLET, FILM COATED ORAL
Qty: 6 TABLET | Refills: 0 | Status: SHIPPED | OUTPATIENT
Start: 2024-10-04 | End: 2024-10-08

## 2024-10-04 NOTE — PROGRESS NOTES
Lori Dey is a 44 year old female.    CC:    Chief Complaint   Patient presents with    Fatigue    Headache    Cough     Wheezy, coughing up dark green to white mucus       HPI:  Productive cough for at least 2 weeks. Sputum is green and thick. Augmentin of little help from the IC. Also placed on Albuterol which is not too helpful for a wheeze but does help her expectorate more mucous. Energy is lower. Denies fevers. Also noted to have a heart murmur on exam at the .    Allergies:  Allergies   Allergen Reactions    Medrol [Methylprednisolone]       Current Meds:  Current Outpatient Medications   Medication Sig Dispense Refill    albuterol (PROAIR HFA) 108 (90 Base) MCG/ACT Inhalation Aero Soln 1-2 puffs every 4-6 hours for 2-3 days then as needed. 1 each 0    benzonatate 200 MG Oral Cap Take 1 capsule (200 mg total) by mouth 3 (three) times daily as needed for cough. 30 capsule 0    albuterol 108 (90 Base) MCG/ACT Inhalation Aero Soln Inhale 2 puffs into the lungs 4 (four) times daily. May substitute with brand name equivalent if less expensive. 1 each 0    citalopram 20 MG Oral Tab Take 0.5 tablets (10 mg total) by mouth daily. 90 tablet 0    losartan 100 MG Oral Tab Take 1 tablet (100 mg total) by mouth daily. 90 tablet 1    ALPRAZolam (XANAX) 0.5 MG Oral Tab Take 1 tablet (0.5 mg total) by mouth daily as needed for Anxiety. 30 tablet 0        History:  Past Medical History:    Anxiety    Depression    Dysfunction of eustachian tube    Esophageal reflux    Essential hypertension, benign    Family history of diabetes mellitus    Temporomandibular joint disorders, unspecified      Past Surgical History:   Procedure Laterality Date            No family history on file.   No family status information on file.      Social History     Socioeconomic History    Marital status:     Number of children: 1   Occupational History    Occupation: homemaker   Tobacco Use    Smoking status: Former      Types: Cigarettes    Smokeless tobacco: Never    Tobacco comments:     exposure to passive smoke   Substance and Sexual Activity    Alcohol use: Yes     Alcohol/week: 0.0 standard drinks of alcohol     Comment: 1drink per night    Drug use: No        ROS:  General: lower energy      Vitals: /78   Pulse 85   Temp 97.9 °F (36.6 °C)   Resp 16   Ht 5' 7\" (1.702 m)   Wt 245 lb (111.1 kg)   LMP 05/17/2024   SpO2 98%   BMI 38.37 kg/m²      Reviewed by BAM Caballero M.D.    Physical Exam:  GEN: well developed, well nourished, in no apparent distress  EYE: B conjunctiva and lids normal  HENT: B pinnas, external auditory canals and tympanic membranes are normal. B nares boggy, soft palate with a small spot of erythema and tiny blister  NECK: No lymphadenopathy, thyromegaly or masses  CAR: S1, S2 normal, RRR; no S3, no S4; no click; +1-2 systolic murmur  PULM: B coarseness and wheeze  GI: not examined  PSYCH: alert and oriented x 3; affect appropriate  SKIN: not examined  EXTREMITIES: No clubbing, cyanosis or edema  GENITAL: not examined  LYMPH: no supraclavicular nodes  NEURO: Awake and alert. Normal speech and articulation. No facial droop or asymmetry. Moving all extremities equally.    ASSESSMENT AND PLAN    1. Productive cough  Take prescribed medications as directed.   F/u 10 days    2. Bronchospasm  Continue Albuterol  Prednisone as directed   F/u in 10 days    3. Cardiac murmur  Will re examine in 10 days    4. HTN  May be elevated due to current illness  Continue Losartan  Recheck in 10 daus       No follow-ups on file.    Orders for this visit:    No orders of the defined types were placed in this encounter.      None    Meds & Refills for this Visit:  Requested Prescriptions     Signed Prescriptions Disp Refills    azithromycin (ZITHROMAX Z-KYLE) 250 MG Oral Tab 6 tablet 0     Sig: Take 2 tablets (500 mg total) by mouth daily for 1 day, THEN 1 tablet (250 mg total) daily for 4 days.    predniSONE 20 MG  Oral Tab 18 tablet 0     Sig: 3 qd x 3 days, then 2 qd x 3 days, then 1 qd x 3 days with food.             Authorized by Steve Caballero M.D.

## 2024-10-07 RX ORDER — LOSARTAN POTASSIUM 100 MG/1
100 TABLET ORAL DAILY
Qty: 90 TABLET | Refills: 0 | Status: SHIPPED | OUTPATIENT
Start: 2024-10-07

## 2024-10-11 RX ORDER — LOSARTAN POTASSIUM 100 MG/1
100 TABLET ORAL DAILY
Qty: 90 TABLET | Refills: 0 | Status: SHIPPED | OUTPATIENT
Start: 2024-10-11

## 2024-10-22 ENCOUNTER — OFFICE VISIT (OUTPATIENT)
Dept: FAMILY MEDICINE CLINIC | Facility: CLINIC | Age: 44
End: 2024-10-22

## 2024-10-22 VITALS
BODY MASS INDEX: 38 KG/M2 | OXYGEN SATURATION: 99 % | SYSTOLIC BLOOD PRESSURE: 132 MMHG | WEIGHT: 244.63 LBS | DIASTOLIC BLOOD PRESSURE: 70 MMHG | TEMPERATURE: 98 F | HEART RATE: 89 BPM

## 2024-10-22 DIAGNOSIS — Z87.891 HISTORY OF TOBACCO USE: ICD-10-CM

## 2024-10-22 DIAGNOSIS — R09.81 NASAL SINUS CONGESTION: ICD-10-CM

## 2024-10-22 DIAGNOSIS — R01.1 SYSTOLIC MURMUR: ICD-10-CM

## 2024-10-22 DIAGNOSIS — R05.9 COUGH, UNSPECIFIED TYPE: Primary | ICD-10-CM

## 2024-10-22 DIAGNOSIS — I10 HYPERTENSION, UNSPECIFIED TYPE: ICD-10-CM

## 2024-10-22 PROCEDURE — 99213 OFFICE O/P EST LOW 20 MIN: CPT | Performed by: FAMILY MEDICINE

## 2024-10-22 RX ORDER — AZELASTINE HCL 205.5 UG/1
1 SPRAY NASAL 2 TIMES DAILY
COMMUNITY
Start: 2024-10-22

## 2024-10-22 NOTE — PROGRESS NOTES
Lori Dey is a 44 year old female.    CC:    Chief Complaint   Patient presents with    Cough       HPI:  F/u cough, nasal congestion, murmur and HTN. Cough is better. Nasal congestion persists. No chest pains or palpitations  Allergies:  Allergies[1]   Current Meds:  Current Outpatient Medications   Medication Sig Dispense Refill    LOSARTAN 100 MG Oral Tab Take 1 tablet (100 mg total) by mouth daily. 90 tablet 0    albuterol (PROAIR HFA) 108 (90 Base) MCG/ACT Inhalation Aero Soln 1-2 puffs every 4-6 hours for 2-3 days then as needed. 1 each 0    albuterol 108 (90 Base) MCG/ACT Inhalation Aero Soln Inhale 2 puffs into the lungs 4 (four) times daily. May substitute with brand name equivalent if less expensive. 1 each 0    citalopram 20 MG Oral Tab Take 0.5 tablets (10 mg total) by mouth daily. 90 tablet 0    ALPRAZolam (XANAX) 0.5 MG Oral Tab Take 1 tablet (0.5 mg total) by mouth daily as needed for Anxiety. 30 tablet 0        History:  Past Medical History:    Anxiety    Depression    Dysfunction of eustachian tube    Esophageal reflux    Essential hypertension, benign    Family history of diabetes mellitus    Temporomandibular joint disorders, unspecified      Past Surgical History:   Procedure Laterality Date            No family history on file.   No family status information on file.      Social History     Socioeconomic History    Marital status:     Number of children: 1   Occupational History    Occupation: homemaker   Tobacco Use    Smoking status: Former     Types: Cigarettes    Smokeless tobacco: Never    Tobacco comments:     exposure to passive smoke   Substance and Sexual Activity    Alcohol use: Yes     Alcohol/week: 0.0 standard drinks of alcohol     Comment: 1drink per night    Drug use: No        ROS:  General: energy level stable      Vitals: /70   Pulse 89   Temp 97.9 °F (36.6 °C) (Temporal)   Wt 244 lb 9.6 oz (110.9 kg)   LMP 10/18/2024 (Exact Date)   SpO2  99%   BMI 38.31 kg/m²    Reviewed by BAM Caballero M.D.    Physical Exam:  GEN: well developed, well nourished, in no apparent distress  EYE: B conjunctiva and lids normal  HENT: B nares boggy and with clear dc. B TM with serous effusion, R > L. No oral lesions.   NECK: No lymphadenopathy, thyromegaly or masses  CAR: S1, S2 normal, RRR; no S3, no S4; no click; +1-2 systolic murmur  PULM: faint B coarseness  GI: not examined  PSYCH: alert and oriented x 3; affect appropriate  SKIN: not examined  EXTREMITIES: No clubbing, cyanosis or edema  GENITAL: not examined  LYMPH: no supraclavicular nodes  NEURO: Awake and alert. Normal speech and articulation. No facial droop or asymmetry. Moving all extremities equally.    ASSESSMENT AND PLAN    1. Cough, unspecified type  Await results   May be related to post nasal drip---see below  - z [facility] XR CHEST PA + LAT CHEST (CPT=71046)    2. History of tobacco use  Await results   - z [facility] XR CHEST PA + LAT CHEST (CPT=71046); Future  - z [facility] XR CHEST PA + LAT CHEST (CPT=71046)    3. Systolic murmur  Will have her see Cardiology to see if ECHO is warranted. She is without insurance and I feel this is a more cost effective means to an end  - Cardio Referral - Internal    4. Hypertension, unspecified type  Stable   Continue present medications     5. Nasal sinus congestion  Trial:  - Azelastine HCl (ASTEPRO) 0.15 % Nasal Solution; 1 spray by Nasal route 2 (two) times daily.    If not helpful then can try Atrovent nasal    Recommended she see ENT as well, however, she is wanting to wait until she has insurance.       No follow-ups on file.    Orders for this visit:    No orders of the defined types were placed in this encounter.      XR CHEST PA + LAT CHEST (CPT=71046)  CARDIO - INTERNAL    Meds & Refills for this Visit:  Requested Prescriptions      No prescriptions requested or ordered in this encounter             Authorized by Steve Caballero M.D.                 [1]    Allergies  Allergen Reactions    Medrol [Methylprednisolone]

## 2025-01-02 RX ORDER — LOSARTAN POTASSIUM 100 MG/1
100 TABLET ORAL DAILY
Qty: 90 TABLET | Refills: 1 | Status: SHIPPED | OUTPATIENT
Start: 2025-01-02

## 2025-03-27 ENCOUNTER — TELEPHONE (OUTPATIENT)
Dept: FAMILY MEDICINE CLINIC | Facility: CLINIC | Age: 45
End: 2025-03-27

## 2025-03-27 DIAGNOSIS — Z12.31 BREAST CANCER SCREENING BY MAMMOGRAM: Primary | ICD-10-CM

## 2025-03-27 RX ORDER — CITALOPRAM HYDROBROMIDE 10 MG/1
10 TABLET ORAL DAILY
Qty: 90 TABLET | Refills: 1 | Status: SHIPPED | OUTPATIENT
Start: 2025-03-27

## 2025-03-27 RX ORDER — ALBUTEROL SULFATE 90 UG/1
2 INHALANT RESPIRATORY (INHALATION) 4 TIMES DAILY
Qty: 1 EACH | Refills: 2 | Status: SHIPPED | OUTPATIENT
Start: 2025-03-27

## 2025-03-27 RX ORDER — CITALOPRAM HYDROBROMIDE 20 MG/1
10 TABLET ORAL DAILY
Qty: 90 TABLET | Refills: 2 | Status: SHIPPED | OUTPATIENT
Start: 2025-03-27 | End: 2025-03-27

## 2025-03-27 NOTE — TELEPHONE ENCOUNTER
Spoke to [place], does doctor want patient to switch to the citalopram 10 mg so patient doesn't have to cut them in half.     Patient's name and  verified     Spoke to patient, she is amenable to switching to 10 mg tabs    Pended medication, sign if appropriate     Patient notified and verbalized an understanding

## 2025-03-27 NOTE — TELEPHONE ENCOUNTER
Please let patient or caregiver know or leave message that refill request for the medication/s listed below has/have come to our office. The refills have been sent.    It appears she is due for mammogram and pap smear. I have placed the order for mammogram. She can talk to our front staff to schedule. She could set up an appt with our NP, Shani, to do the pap smear.    Thanks     Requested Prescriptions     Signed Prescriptions Disp Refills    citalopram 20 MG Oral Tab 90 tablet 2     Sig: Take 0.5 tablets (10 mg total) by mouth daily.     Authorizing Provider: KARYNA CHAUHAN    albuterol 108 (90 Base) MCG/ACT Inhalation Aero Soln 1 each 2     Sig: Inhale 2 puffs into the lungs 4 (four) times daily. May substitute with brand name equivalent if less expensive.     Authorizing Provider: KARYNA CHAUHAN

## 2025-03-27 NOTE — TELEPHONE ENCOUNTER
[facility] CALL[facility] REGARDING            citalopram 20 MG Oral Tab 90 tablet 2 3/27/2025 --   Sig:   Take 0.5 tablets (10 mg total) by mouth daily.     Route:   Oral

## (undated) NOTE — LETTER
George Rosales  [address] [facility] [address]    3/7/2018      Dear  George Rosales    In order to provide the highest quality care, LISA [facility] uses a sophisticated computer system to track our patient's records.   You a

## (undated) NOTE — Clinical Note
ASTHMA ACTION PLAN for Pradeep Carey     : 1980     Date: 2017  Doctor:  Gerardo Wilson MD  Phone for doctor or clinic: [place] [address]  [address] [address]  [phone] the plan was given to the patient/caregiver. Asthma Action Plan reviewed with the caregiver and patient on the phone, and copy mailed to patient/caregiver or sent via [address]      Signatures:   Provider  Nat Miner MD Patient  Marianna Esquivel Caretaker